# Patient Record
Sex: FEMALE | Race: WHITE | NOT HISPANIC OR LATINO | Employment: FULL TIME | URBAN - METROPOLITAN AREA
[De-identification: names, ages, dates, MRNs, and addresses within clinical notes are randomized per-mention and may not be internally consistent; named-entity substitution may affect disease eponyms.]

---

## 2023-08-23 PROBLEM — E04.1 THYROID NODULE: Status: ACTIVE | Noted: 2023-08-23

## 2023-08-23 PROBLEM — G44.209 TENSION HEADACHE: Status: ACTIVE | Noted: 2023-08-23

## 2023-08-23 PROBLEM — J45.909 REACTIVE AIRWAY DISEASE (HHS-HCC): Status: ACTIVE | Noted: 2023-08-23

## 2023-08-23 PROBLEM — E66.9 OBESITY, CLASS I, BMI 30-34.9: Status: ACTIVE | Noted: 2023-08-23

## 2023-08-23 PROBLEM — E78.5 HYPERLIPIDEMIA: Status: ACTIVE | Noted: 2023-08-23

## 2023-08-23 PROBLEM — R59.0 LOCALIZED ENLARGED LYMPH NODES: Status: ACTIVE | Noted: 2023-08-23

## 2023-08-23 PROBLEM — I10 BENIGN HYPERTENSION: Status: ACTIVE | Noted: 2023-08-23

## 2023-08-23 PROBLEM — N93.8 DUB (DYSFUNCTIONAL UTERINE BLEEDING): Status: ACTIVE | Noted: 2023-08-23

## 2023-08-23 PROBLEM — M70.822: Status: ACTIVE | Noted: 2023-08-23

## 2023-08-23 PROBLEM — E66.811 OBESITY, CLASS I, BMI 30-34.9: Status: ACTIVE | Noted: 2023-08-23

## 2023-08-23 PROBLEM — M25.549 PAIN IN THUMB JOINT WITH MOVEMENT: Status: RESOLVED | Noted: 2023-08-23 | Resolved: 2023-08-23

## 2023-08-23 PROBLEM — M25.472 LEFT ANKLE SWELLING: Status: ACTIVE | Noted: 2023-08-23

## 2023-08-23 RX ORDER — TRIAMTERENE/HYDROCHLOROTHIAZID 37.5-25 MG
1 TABLET ORAL DAILY
COMMUNITY
End: 2023-09-25 | Stop reason: SDUPTHER

## 2023-08-23 RX ORDER — FLUTICASONE PROPIONATE 50 MCG
SPRAY, SUSPENSION (ML) NASAL
COMMUNITY
Start: 2022-11-02 | End: 2023-09-25 | Stop reason: ALTCHOICE

## 2023-08-23 RX ORDER — PROMETHAZINE HYDROCHLORIDE AND DEXTROMETHORPHAN HYDROBROMIDE 6.25; 15 MG/5ML; MG/5ML
SYRUP ORAL
COMMUNITY
Start: 2022-11-02 | End: 2023-09-25 | Stop reason: ALTCHOICE

## 2023-08-23 RX ORDER — LOSARTAN POTASSIUM 50 MG/1
75 TABLET ORAL DAILY
COMMUNITY
Start: 2021-05-28 | End: 2023-09-25 | Stop reason: SDUPTHER

## 2023-08-23 RX ORDER — LEVONORGESTREL 52 MG/1
INTRAUTERINE DEVICE INTRAUTERINE
COMMUNITY

## 2023-08-23 RX ORDER — CYCLOBENZAPRINE HCL 5 MG
TABLET ORAL
COMMUNITY
Start: 2018-05-07 | End: 2023-09-25 | Stop reason: ALTCHOICE

## 2023-08-30 ENCOUNTER — APPOINTMENT (OUTPATIENT)
Dept: PRIMARY CARE | Facility: CLINIC | Age: 48
End: 2023-08-30
Payer: COMMERCIAL

## 2023-09-25 ENCOUNTER — OFFICE VISIT (OUTPATIENT)
Dept: PRIMARY CARE | Facility: CLINIC | Age: 48
End: 2023-09-25
Payer: COMMERCIAL

## 2023-09-25 VITALS
DIASTOLIC BLOOD PRESSURE: 85 MMHG | WEIGHT: 196.25 LBS | HEART RATE: 94 BPM | BODY MASS INDEX: 34.77 KG/M2 | HEIGHT: 63 IN | OXYGEN SATURATION: 95 % | SYSTOLIC BLOOD PRESSURE: 133 MMHG

## 2023-09-25 DIAGNOSIS — I10 BENIGN HYPERTENSION: Primary | ICD-10-CM

## 2023-09-25 DIAGNOSIS — R23.2 HOT FLASHES: ICD-10-CM

## 2023-09-25 PROCEDURE — 1036F TOBACCO NON-USER: CPT | Performed by: FAMILY MEDICINE

## 2023-09-25 PROCEDURE — 3079F DIAST BP 80-89 MM HG: CPT | Performed by: FAMILY MEDICINE

## 2023-09-25 PROCEDURE — 99214 OFFICE O/P EST MOD 30 MIN: CPT | Performed by: FAMILY MEDICINE

## 2023-09-25 PROCEDURE — 3075F SYST BP GE 130 - 139MM HG: CPT | Performed by: FAMILY MEDICINE

## 2023-09-25 PROCEDURE — 3008F BODY MASS INDEX DOCD: CPT | Performed by: FAMILY MEDICINE

## 2023-09-25 RX ORDER — TRIAMTERENE/HYDROCHLOROTHIAZID 37.5-25 MG
1 TABLET ORAL DAILY
Qty: 90 TABLET | Refills: 1 | Status: SHIPPED | OUTPATIENT
Start: 2023-09-25 | End: 2024-03-15 | Stop reason: SDUPTHER

## 2023-09-25 RX ORDER — LOSARTAN POTASSIUM 50 MG/1
75 TABLET ORAL DAILY
Qty: 135 TABLET | Refills: 1 | Status: SHIPPED | OUTPATIENT
Start: 2023-09-25 | End: 2024-03-15 | Stop reason: SDUPTHER

## 2023-09-25 ASSESSMENT — ENCOUNTER SYMPTOMS
CONFUSION: 0
LOSS OF SENSATION IN FEET: 0
BLOOD IN STOOL: 0
WHEEZING: 0
OCCASIONAL FEELINGS OF UNSTEADINESS: 0
CHILLS: 0
VOMITING: 0
LIGHT-HEADEDNESS: 0
ABDOMINAL PAIN: 0
FEVER: 0
DYSURIA: 0
TROUBLE SWALLOWING: 0
DIFFICULTY URINATING: 0
DIZZINESS: 0
SHORTNESS OF BREATH: 0
DIARRHEA: 0
COUGH: 0
NUMBNESS: 0
UNEXPECTED WEIGHT CHANGE: 0
WEAKNESS: 0
DEPRESSION: 0
NAUSEA: 0

## 2023-09-25 ASSESSMENT — PATIENT HEALTH QUESTIONNAIRE - PHQ9
SUM OF ALL RESPONSES TO PHQ9 QUESTIONS 1 AND 2: 0
2. FEELING DOWN, DEPRESSED OR HOPELESS: NOT AT ALL
1. LITTLE INTEREST OR PLEASURE IN DOING THINGS: NOT AT ALL

## 2023-09-25 NOTE — PROGRESS NOTES
"Subjective   Patient ID: Alice Archer is a 48 y.o. female who presents for Establish Care (Pt presents as a new to pt to establish care, no concerns, refills needed.BL).  HPI    Previously was seeing Kyleigh Castellano CNP, last seen August 2022.    Feeling generally well.    Checking BP at home, gets numbers similar to today.    C/o hot flashes, would like to check hormones to check for menopause. Does not have bleeding or even spotting or any symptoms of menses with the Mirena, which was placed < 7y ago (December 2017).      Review of Systems   Constitutional:  Negative for chills, fever and unexpected weight change.   HENT:  Negative for ear pain and trouble swallowing.    Respiratory:  Negative for cough, shortness of breath and wheezing.    Cardiovascular:  Negative for chest pain.   Gastrointestinal:  Negative for abdominal pain, blood in stool, diarrhea, nausea and vomiting.   Genitourinary:  Negative for difficulty urinating and dysuria.   Skin:  Negative for rash.   Neurological:  Negative for dizziness, syncope, weakness, light-headedness and numbness.   Psychiatric/Behavioral:  Negative for behavioral problems and confusion.          Objective   /85   Pulse 94   Ht 1.588 m (5' 2.5\")   Wt 89 kg (196 lb 4 oz)   SpO2 95%   BMI 35.32 kg/m²     Physical Exam  Vitals and nursing note reviewed.   Constitutional:       General: She is not in acute distress.     Appearance: She is not diaphoretic.   HENT:      Head: Normocephalic and atraumatic.   Cardiovascular:      Rate and Rhythm: Normal rate and regular rhythm.      Heart sounds: Normal heart sounds.   Pulmonary:      Effort: Pulmonary effort is normal.      Breath sounds: Normal breath sounds.   Abdominal:      General: Bowel sounds are normal. There is no distension.      Palpations: Abdomen is soft. There is no mass.      Tenderness: There is no abdominal tenderness. There is no guarding or rebound.   Musculoskeletal:      Right lower leg: No " edema.      Left lower leg: No edema.   Skin:     General: Skin is warm and dry.   Neurological:      General: No focal deficit present.      Mental Status: She is alert. Mental status is at baseline.   Psychiatric:         Mood and Affect: Mood normal.         Thought Content: Thought content normal.         Assessment/Plan   Problem List Items Addressed This Visit       Benign hypertension - Primary     Well controlled. Monitor. Return 6 months.         Relevant Medications    losartan (Cozaar) 50 mg tablet    triamterene-hydrochlorothiazid (Maxzide-25) 37.5-25 mg tablet    Other Relevant Orders    Comprehensive Metabolic Panel    CBC    Lipid Panel    TSH with reflex to Free T4 if abnormal    Hot flashes     Requests hormone level check. Check FSH, cautioned might not be accurate, and TSH.         Relevant Orders    TSH with reflex to Free T4 if abnormal    FSH

## 2023-09-25 NOTE — PATIENT INSTRUCTIONS
Monitor your resting blood pressure (BP) and heart rate (HR) at home.     Please return for a follow-up appointment in 6 months, earlier if any question or concern.    Recommend scheduling your annual Wellness visit for 12 months after your previous Wellness visit, or within 1-2 months.    Please be aware that hormonal therapies (e.g., hormonal IUD) have the potential to affect your hormone levels, such that your FSH (follicle-stimulating hormone) level might not be accurate.    For assistance with scheduling referrals or consultations, please call [phone]. For laboratory, radiology, and other tests, please call [phone] ([phone] for pediatrics). Please review prescription inserts and published information for possible adverse effects of all medications. Return after testing or consultation to review results and recommendations, if symptoms persist, change, worsen, or return, or if you have any question or concern. If you do not get results within 7-10 days, or you have any question or concern, please send a message, call the office ([phone]), or return to the office for a follow-up appointment. For non-emergencies, you may call the office. For emergencies, call [phone] or go to the nearest Emergency Department. Please schedule additional appointment(s) to address concern(s) not addressed today.    In general, results are not released or discussed over the telephone. Results of tests done through [facility] are released via  [facility] (see below).  [email or web address]   [facility] support line: [phone]    Until we complete our transition to the new system, additional information can be found at [email or web address] or on your Android or iOS (iPhone, iPad) device using the [employer] karyn available free of charge in your device's karyn store.

## 2024-01-06 ENCOUNTER — LAB (OUTPATIENT)
Dept: LAB | Facility: LAB | Age: 49
End: 2024-01-06
Payer: COMMERCIAL

## 2024-01-06 DIAGNOSIS — I10 BENIGN HYPERTENSION: ICD-10-CM

## 2024-01-06 DIAGNOSIS — R23.2 HOT FLASHES: ICD-10-CM

## 2024-01-06 LAB
ALBUMIN SERPL BCP-MCNC: 4.1 G/DL (ref 3.4–5)
ALP SERPL-CCNC: 70 U/L (ref 33–110)
ALT SERPL W P-5'-P-CCNC: 24 U/L (ref 7–45)
ANION GAP SERPL CALC-SCNC: 13 MMOL/L (ref 10–20)
AST SERPL W P-5'-P-CCNC: 19 U/L (ref 9–39)
BILIRUB SERPL-MCNC: 0.5 MG/DL (ref 0–1.2)
BUN SERPL-MCNC: 15 MG/DL (ref 6–23)
CALCIUM SERPL-MCNC: 9.1 MG/DL (ref 8.6–10.3)
CHLORIDE SERPL-SCNC: 102 MMOL/L (ref 98–107)
CHOLEST SERPL-MCNC: 236 MG/DL (ref 0–199)
CHOLESTEROL/HDL RATIO: 4
CO2 SERPL-SCNC: 27 MMOL/L (ref 21–32)
CREAT SERPL-MCNC: 0.7 MG/DL (ref 0.5–1.05)
ERYTHROCYTE [DISTWIDTH] IN BLOOD BY AUTOMATED COUNT: 13.3 % (ref 11.5–14.5)
FSH SERPL-ACNC: 62.2 IU/L
GFR SERPL CREATININE-BSD FRML MDRD: >90 ML/MIN/1.73M*2
GLUCOSE SERPL-MCNC: 98 MG/DL (ref 74–99)
HCT VFR BLD AUTO: 42.7 % (ref 36–46)
HDLC SERPL-MCNC: 59.7 MG/DL
HGB BLD-MCNC: 14.2 G/DL (ref 12–16)
LDLC SERPL CALC-MCNC: 146 MG/DL
MCH RBC QN AUTO: 32.3 PG (ref 26–34)
MCHC RBC AUTO-ENTMCNC: 33.3 G/DL (ref 32–36)
MCV RBC AUTO: 97 FL (ref 80–100)
NON HDL CHOLESTEROL: 176 MG/DL (ref 0–149)
NRBC BLD-RTO: 0 /100 WBCS (ref 0–0)
PLATELET # BLD AUTO: 334 X10*3/UL (ref 150–450)
POTASSIUM SERPL-SCNC: 4.1 MMOL/L (ref 3.5–5.3)
PROT SERPL-MCNC: 6.8 G/DL (ref 6.4–8.2)
RBC # BLD AUTO: 4.4 X10*6/UL (ref 4–5.2)
SODIUM SERPL-SCNC: 138 MMOL/L (ref 136–145)
TRIGL SERPL-MCNC: 153 MG/DL (ref 0–149)
TSH SERPL-ACNC: 1.18 MIU/L (ref 0.44–3.98)
VLDL: 31 MG/DL (ref 0–40)
WBC # BLD AUTO: 7 X10*3/UL (ref 4.4–11.3)

## 2024-01-06 PROCEDURE — 83001 ASSAY OF GONADOTROPIN (FSH): CPT

## 2024-01-06 PROCEDURE — 80053 COMPREHEN METABOLIC PANEL: CPT

## 2024-01-06 PROCEDURE — 84443 ASSAY THYROID STIM HORMONE: CPT

## 2024-01-06 PROCEDURE — 36415 COLL VENOUS BLD VENIPUNCTURE: CPT

## 2024-01-06 PROCEDURE — 80061 LIPID PANEL: CPT

## 2024-01-06 PROCEDURE — 85027 COMPLETE CBC AUTOMATED: CPT

## 2024-01-10 ENCOUNTER — APPOINTMENT (OUTPATIENT)
Dept: PRIMARY CARE | Facility: CLINIC | Age: 49
End: 2024-01-10
Payer: COMMERCIAL

## 2024-03-14 DIAGNOSIS — I10 BENIGN HYPERTENSION: ICD-10-CM

## 2024-03-14 NOTE — TELEPHONE ENCOUNTER
MEDICATION REFILL    Pharmacy Name:     [facility]  Medication requested       Losartan   50 mg  1.5 tabs daily  Tramterene-hydrochlorothiazide 37.5-25 mg                                                   1 tab daily        Dosage [see above]   Quantity     90 days    Allergies     none given  Date of last visit    09/25/2023  Date of Next Appointment   07/09/2024

## 2024-03-15 RX ORDER — LOSARTAN POTASSIUM 50 MG/1
75 TABLET ORAL DAILY
Qty: 135 TABLET | Refills: 1 | Status: SHIPPED | OUTPATIENT
Start: 2024-03-15

## 2024-03-15 RX ORDER — TRIAMTERENE/HYDROCHLOROTHIAZID 37.5-25 MG
1 TABLET ORAL DAILY
Qty: 90 TABLET | Refills: 1 | Status: SHIPPED | OUTPATIENT
Start: 2024-03-15

## 2024-06-20 ENCOUNTER — HOSPITAL ENCOUNTER (EMERGENCY)
Facility: HOSPITAL | Age: 49
Discharge: HOME | End: 2024-06-20
Payer: COMMERCIAL

## 2024-06-20 ENCOUNTER — APPOINTMENT (OUTPATIENT)
Dept: RADIOLOGY | Facility: HOSPITAL | Age: 49
End: 2024-06-20
Payer: COMMERCIAL

## 2024-06-20 VITALS
WEIGHT: 175 LBS | DIASTOLIC BLOOD PRESSURE: 87 MMHG | SYSTOLIC BLOOD PRESSURE: 160 MMHG | BODY MASS INDEX: 32.2 KG/M2 | RESPIRATION RATE: 16 BRPM | TEMPERATURE: 97.9 F | OXYGEN SATURATION: 94 % | HEART RATE: 100 BPM | HEIGHT: 62 IN

## 2024-06-20 DIAGNOSIS — S97.82XA CRUSHING INJURY OF LEFT FOOT, INITIAL ENCOUNTER: Primary | ICD-10-CM

## 2024-06-20 PROCEDURE — 73630 X-RAY EXAM OF FOOT: CPT | Mod: LEFT SIDE | Performed by: RADIOLOGY

## 2024-06-20 PROCEDURE — 73630 X-RAY EXAM OF FOOT: CPT | Mod: LT

## 2024-06-20 PROCEDURE — 99283 EMERGENCY DEPT VISIT LOW MDM: CPT

## 2024-06-20 ASSESSMENT — PAIN DESCRIPTION - PAIN TYPE: TYPE: ACUTE PAIN

## 2024-06-20 ASSESSMENT — LIFESTYLE VARIABLES
HAVE PEOPLE ANNOYED YOU BY CRITICIZING YOUR DRINKING: NO
EVER FELT BAD OR GUILTY ABOUT YOUR DRINKING: NO
HAVE YOU EVER FELT YOU SHOULD CUT DOWN ON YOUR DRINKING: NO
TOTAL SCORE: 0
EVER HAD A DRINK FIRST THING IN THE MORNING TO STEADY YOUR NERVES TO GET RID OF A HANGOVER: NO

## 2024-06-20 ASSESSMENT — COLUMBIA-SUICIDE SEVERITY RATING SCALE - C-SSRS
6. HAVE YOU EVER DONE ANYTHING, STARTED TO DO ANYTHING, OR PREPARED TO DO ANYTHING TO END YOUR LIFE?: NO
2. HAVE YOU ACTUALLY HAD ANY THOUGHTS OF KILLING YOURSELF?: NO
1. IN THE PAST MONTH, HAVE YOU WISHED YOU WERE DEAD OR WISHED YOU COULD GO TO SLEEP AND NOT WAKE UP?: NO

## 2024-06-20 ASSESSMENT — PAIN SCALES - GENERAL: PAINLEVEL_OUTOF10: 7

## 2024-06-20 ASSESSMENT — PAIN - FUNCTIONAL ASSESSMENT: PAIN_FUNCTIONAL_ASSESSMENT: 0-10

## 2024-06-20 ASSESSMENT — PAIN DESCRIPTION - LOCATION: LOCATION: FOOT

## 2024-06-20 ASSESSMENT — PAIN DESCRIPTION - ORIENTATION: ORIENTATION: LEFT

## 2024-06-21 NOTE — ED PROVIDER NOTES
HPI   Chief Complaint   Patient presents with    Foot Injury     TV was dropped on left foot approx 1 hour ago. Pt has a history of surgery to the left foot with screws in the foot, no pain medications prior to arrival        This is a 49-year-old female presenting after dropping 100 pound TV on her left foot just PTA.  Minimally able to bear weight since the injury.  Has history of hardware placement distantly in the left foot.  No numbness, tingling or loss of sensation.      History provided by:  Patient   used: No                        [place] Coma Scale Score: 15                     Patient History   Past Medical History:   Diagnosis Date    Cutaneous abscess of left axilla 08/29/2016    Cutaneous abscess of left axilla    Encounter for insertion of intrauterine contraceptive device 12/14/2017    Encounter for insertion of intrauterine contraceptive device (IUD)    Encounter for screening for malignant neoplasm, site unspecified 11/04/2014    Encounter for Pap smear    Hypertension     Localized swelling, mass and lump, left upper limb 06/24/2020    Mass of left axilla    Other conditions influencing health status     Dysplasia    Pain in thumb joint with movement 08/23/2023    Personal history of other endocrine, nutritional and metabolic disease     History of hypokalemia    Personal history of other infectious and parasitic diseases     History of chickenpox    Personal history of other infectious and parasitic diseases     History of HPV infection    Personal history of other specified conditions 10/10/2018    History of diarrhea    Unspecified symptoms and signs involving the genitourinary system 08/06/2020    UTI symptoms    Urinary tract infection, site not specified 11/10/2016    Acute lower UTI     Past Surgical History:   Procedure Laterality Date    BUNIONECTOMY      CERVICAL BIOPSY  W/ LOOP ELECTRODE EXCISION  08/25/2014    Cervical Loop Electrosurgical Excision (LEEP)      SECTION, LOW TRANSVERSE      FOOT SURGERY  2014    Foot Surgery    OTHER SURGICAL HISTORY  2014    Colposcopy Cervix With Biopsy(S)     Family History   Problem Relation Name Age of Onset    COPD Mother      Febrile seizures Mother       Social History     Tobacco Use    Smoking status: Never    Smokeless tobacco: Never   Vaping Use    Vaping status: Never Used   Substance Use Topics    Alcohol use: Yes     Comment: 312 it varies    Drug use: Never       Physical Exam   ED Triage Vitals [24]   Temperature Heart Rate Respirations BP   36.6 °C (97.9 °F) 100 16 160/87      Pulse Ox Temp src Heart Rate Source Patient Position   94 % -- -- --      BP Location FiO2 (%)     -- --       Physical Exam    Gen.: Vitals noted. No distress  Cardiac: Regular rate rhythm. No murmur.   Pulmonary: Lungs clear bilaterally  Lower extremity: Exam of the left foot shows generalized swelling and bruising most notable over the first and second metatarsal region with a small superficial abrasion over this area.  No laceration.  Compartments soft.  Is neurovascularly intact distally.     ED Course & MDM   Diagnoses as of 24   Crushing injury of left foot, initial encounter       Medical Decision Making  DDx: fracture, dislocation, nonvisualized/occult fracture, tendon/ligament injury, soft tissue injury    Patient is neurovascularly intact.  Compartments soft.  X-ray negative for acute fracture or hardware disruption as read by the radiologist.  Patient was advised of return precautions and warning signs of compartment syndrome.  Given postop shoe and crutches and advised RICE and OTC analgesics/anti-inflammatories as needed given orthopedic referral if needed.  Instructed to return to the nearest ED if any concerns or new or worsening symptoms. Patient verbalized understanding and agreement with plan. Discharged in stable condition.      Disclaimer: This note was dictated using speech recognition  software. An attempt at proofreading was made to minimize errors. Minor errors in transcription may be present. Please call if questions.    Amount and/or Complexity of Data Reviewed  Radiology: ordered.        Procedure  Procedures     Armani Hamilton PA-C  06/20/24 1541

## 2024-06-27 ENCOUNTER — OFFICE VISIT (OUTPATIENT)
Dept: ORTHOPEDIC SURGERY | Facility: CLINIC | Age: 49
End: 2024-06-27
Payer: COMMERCIAL

## 2024-06-27 DIAGNOSIS — S90.32XA CONTUSION OF LEFT FOOT, INITIAL ENCOUNTER: Primary | ICD-10-CM

## 2024-06-27 PROCEDURE — 99203 OFFICE O/P NEW LOW 30 MIN: CPT | Performed by: STUDENT IN AN ORGANIZED HEALTH CARE EDUCATION/TRAINING PROGRAM

## 2024-06-27 PROCEDURE — 99213 OFFICE O/P EST LOW 20 MIN: CPT | Performed by: STUDENT IN AN ORGANIZED HEALTH CARE EDUCATION/TRAINING PROGRAM

## 2024-06-27 ASSESSMENT — PAIN DESCRIPTION - DESCRIPTORS: DESCRIPTORS: ACHING;SORE

## 2024-06-27 ASSESSMENT — PAIN SCALES - GENERAL: PAINLEVEL_OUTOF10: 3

## 2024-06-27 ASSESSMENT — PAIN - FUNCTIONAL ASSESSMENT: PAIN_FUNCTIONAL_ASSESSMENT: 0-10

## 2024-06-27 NOTE — PROGRESS NOTES
ORTHOPAEDIC SURGERY HISTORY & PHYSICAL     Chief Complaint:  Left foot pain    History Of Present Illness  Alice Archer is a 49 y.o. female who presents for evaluation of left foot pain as a referral from Armani Powell PA-C.  Patient sustained an injury to her foot from 06/20/2024 when a CRT TV was dropped onto her left foot.  She was seen in the ER setting where x-rays were obtained and she was recommended to be weightbearing as tolerated in a postoperative shoe.  The shoe did not appear to help her pain so she self discontinued his weight.  She has noticed persistent swelling and has had difficulty fitting into regular shoes.  She has been wearing flats.  She denies any associated numbness, tingling or weakness.  Patient reports a remote history of 2 prior bunionectomies done in the [facility] many years ago.  She reports no residual pain or injury to the feet.  She denies any associated numbness, tingling or weakness.     Past Medical History  Past Medical History:   Diagnosis Date    Cutaneous abscess of left axilla 08/29/2016    Cutaneous abscess of left axilla    Encounter for insertion of intrauterine contraceptive device 12/14/2017    Encounter for insertion of intrauterine contraceptive device (IUD)    Encounter for screening for malignant neoplasm, site unspecified 11/04/2014    Encounter for Pap smear    Hypertension     Localized swelling, mass and lump, left upper limb 06/24/2020    Mass of left axilla    Other conditions influencing health status     Dysplasia    Pain in thumb joint with movement 08/23/2023    Personal history of other endocrine, nutritional and metabolic disease     History of hypokalemia    Personal history of other infectious and parasitic diseases     History of chickenpox    Personal history of other infectious and parasitic diseases     History of HPV infection    Personal history of other specified conditions 10/10/2018    History of diarrhea    Unspecified symptoms and  signs involving the genitourinary system 08/06/2020    UTI symptoms    Urinary tract infection, site not specified 11/10/2016    Acute lower UTI       Surgical History  Recent Surgeries in Orthopaedic Surgery            No cases to display             Social History  Social History     Socioeconomic History    Marital status:      Spouse name: Not on file    Number of children: Not on file    Years of education: Not on file    Highest education level: Not on file   Occupational History    Not on file   Tobacco Use    Smoking status: Never    Smokeless tobacco: Never   Vaping Use    Vaping status: Never Used   Substance and Sexual Activity    Alcohol use: Yes     Comment: 3-12 it varies    Drug use: Never    Sexual activity: Not on file   Other Topics Concern    Not on file   Social History Narrative    Not on file     Social Determinants of Health     Financial Resource Strain: Not on file   Food Insecurity: Not on file   Transportation Needs: Not on file   Physical Activity: Not on file   Stress: Not on file   Social Connections: Not on file   Intimate Partner Violence: Not on file   Housing Stability: Not on file       Family History  Family History   Problem Relation Name Age of Onset    COPD Mother      Febrile seizures Mother          Allergies  Allergies   Allergen Reactions    Oxycodone-Acetaminophen Hives and Unknown    Sulfamethoxazole-Trimethoprim Swelling and Unknown    Amoxicillin Other and Rash    Clarithromycin Rash and Unknown    Loracarbef Rash       Review of Systems  REVIEW OF SYSTEMS  Constitutional: no unplanned weight loss  Psychiatric: no suicidal ideation  ENT: no vision changes, no sinus problems  Pulmonary: no shortness of breath  Lymphatic: no enlarged lymph nodes  Cardiovascular: no chest pain or shortness of breath  Gastrointestinal: no stomach problems  Genitourinary: no dysuria   Skin: no rashes  Endocrine: no thyroid problems  Neurological: no headache, no  numbness  Hematological: no easy bruising  Musculoskeletal: Left foot pain    Physical Exam  PHYSICAL EXAMINATION  Constitutional Exam: well developed and well nourished  Psychiatric Exam: alert and oriented, appropriate mood and behavior  Eye Exam: EOMI  Pulmonary Exam: breathing non-labored, no apparent distress  Lymphatic exam: no appreciable lymphadenopathy in the lower extremities  Cardiovascular exam: RRR to peripheral palpation, DP pulses 2+, PT 2+, toes are pink with good capillary refill, no pitting edema  Skin exam: no open lesions, rashes, abrasions or ulcerations  Neurological exam: sensation to light touch intact in both lower extremities in peripheral and dermatomal distributions (except for any abnormalities noted in musculoskeletal exam)    Musculoskeletal exam: Left lower extremity examination.  Patient has obvious ecchymosis overlying the dorsum of the forefoot, particularly medially.  Patient has well-healed dorsal medial bunionectomy incisions.  Patient has pain-free and supple midtarsal joint range of motion, first metatarsophalangeal joint range of motion and 2 through 5 toe range of motion.  Patient has sensation intact to light touch grossly in a saphenous, sural, superficial peroneal, deep peroneal and tibial nerve distribution.  Patient has 5 out of 5 strength in plantarflexion, dorsiflexion and EHL. Patient has 2+ DP/PT pulse palpated.    Last Recorded Vitals  There were no vitals taken for this visit.    Laboratory Results    No results found for this or any previous visit (from the past 24 hour(s)).    Radiology Results   X-ray imaging 3 view nonweightbearing left foot reviewed from 06/20/2024 and independently evaluated by me demonstrates no acute fracture or dislocation, sequela of prior bunionectomy noted.    Assessment/Plan:  49-year-old female who my impression has left foot contusion following direct blow to the dorsum of her foot without radiographically identifiable fracture.  I  have reviewed the diagnosis and treatment options extensively with the patient.  I would recommend the patient continue weightbearing to tolerance in her left lower extremity.  Discussed the role for sturdy, supportive and accommodative footwear.  She may certainly continue with rest and elevation and would likely benefit from compression socks or wraps for swelling control.  I have no formal restrictions for her at this time point but have encouraged the patient to contact the office if she develops any new pain, worsening symptoms if she has any further questions.  Upon return, patient will require three-view weightbearing left foot.    Paul Higginbotham MD, ZACKERY  Department of Orthopaedic Surgery  [facility]    The diagnosis and treatment plan were reviewed with the patient. All questions were answered. The patient verbalized understanding of the treatment plan. There were no barriers to understanding identified.    Note dictated with [employer] software.  Completed without full type editing and sent to avoid delay.

## 2024-06-27 NOTE — LETTER
June 27, 2024     Alfonzo Meléndez DO  [address]  [address]  [facility] [address]    Patient: Alice Archer   YOB: 1975   Date of Visit: 6/27/2024       Dear Dr. Alfonzo Meléndez DO:    Thank you for referring Alice Archer to me for evaluation. Below are my notes for this consultation.  If you have questions, please do not hesitate to call me. I look forward to following your patient along with you.       Sincerely,     Paul Higginbotham MD      CC: Armani Hamilton PA-C  ______________________________________________________________________________________    ORTHOPAEDIC SURGERY HISTORY & PHYSICAL     Chief Complaint:  Left foot pain    History Of Present Illness  Alice Archer is a 49 y.o. female who presents for evaluation of left foot pain as a referral from Armani Powell PA-C.  Patient sustained an injury to her foot from 06/20/2024 when a CRT TV was dropped onto her left foot.  She was seen in the ER setting where x-rays were obtained and she was recommended to be weightbearing as tolerated in a postoperative shoe.  The shoe did not appear to help her pain so she self discontinued his weight.  She has noticed persistent swelling and has had difficulty fitting into regular shoes.  She has been wearing flats.  She denies any associated numbness, tingling or weakness.  Patient reports a remote history of 2 prior bunionectomies done in the [place] area many years ago.  She reports no residual pain or injury to the feet.  She denies any associated numbness, tingling or weakness.     Past Medical History  Past Medical History:   Diagnosis Date   • Cutaneous abscess of left axilla 08/29/2016    Cutaneous abscess of left axilla   • Encounter for insertion of intrauterine contraceptive device 12/14/2017    Encounter for insertion of intrauterine contraceptive device (IUD)   • Encounter for screening for malignant neoplasm, site unspecified 11/04/2014    Encounter for Pap smear   • Hypertension     • Localized swelling, mass and lump, left upper limb 06/24/2020    Mass of left axilla   • Other conditions influencing health status     Dysplasia   • Pain in thumb joint with movement 08/23/2023   • Personal history of other endocrine, nutritional and metabolic disease     History of hypokalemia   • Personal history of other infectious and parasitic diseases     History of chickenpox   • Personal history of other infectious and parasitic diseases     History of HPV infection   • Personal history of other specified conditions 10/10/2018    History of diarrhea   • Unspecified symptoms and signs involving the genitourinary system 08/06/2020    UTI symptoms   • Urinary tract infection, site not specified 11/10/2016    Acute lower UTI       Surgical History  Recent Surgeries in Orthopaedic Surgery            No cases to display             Social History  Social History     Socioeconomic History   • Marital status:      Spouse name: Not on file   • Number of children: Not on file   • Years of education: Not on file   • Highest education level: Not on file   Occupational History   • Not on file   Tobacco Use   • Smoking status: Never   • Smokeless tobacco: Never   Vaping Use   • Vaping status: Never Used   Substance and Sexual Activity   • Alcohol use: Yes     Comment: 3-12 it varies   • Drug use: Never   • Sexual activity: Not on file   Other Topics Concern   • Not on file   Social History Narrative   • Not on file     Social Determinants of Health     Financial Resource Strain: Not on file   Food Insecurity: Not on file   Transportation Needs: Not on file   Physical Activity: Not on file   Stress: Not on file   Social Connections: Not on file   Intimate Partner Violence: Not on file   Housing Stability: Not on file       Family History  Family History   Problem Relation Name Age of Onset   • COPD Mother     • Febrile seizures Mother          Allergies  Allergies   Allergen Reactions   •  Oxycodone-Acetaminophen Hives and Unknown   • Sulfamethoxazole-Trimethoprim Swelling and Unknown   • Amoxicillin Other and Rash   • Clarithromycin Rash and Unknown   • Loracarbef Rash       Review of Systems  REVIEW OF SYSTEMS  Constitutional: no unplanned weight loss  Psychiatric: no suicidal ideation  ENT: no vision changes, no sinus problems  Pulmonary: no shortness of breath  Lymphatic: no enlarged lymph nodes  Cardiovascular: no chest pain or shortness of breath  Gastrointestinal: no stomach problems  Genitourinary: no dysuria   Skin: no rashes  Endocrine: no thyroid problems  Neurological: no headache, no numbness  Hematological: no easy bruising  Musculoskeletal: Left foot pain    Physical Exam  PHYSICAL EXAMINATION  Constitutional Exam: well developed and well nourished  Psychiatric Exam: alert and oriented, appropriate mood and behavior  Eye Exam: EOMI  Pulmonary Exam: breathing non-labored, no apparent distress  Lymphatic exam: no appreciable lymphadenopathy in the lower extremities  Cardiovascular exam: RRR to peripheral palpation, DP pulses 2+, PT 2+, toes are pink with good capillary refill, no pitting edema  Skin exam: no open lesions, rashes, abrasions or ulcerations  Neurological exam: sensation to light touch intact in both lower extremities in peripheral and dermatomal distributions (except for any abnormalities noted in musculoskeletal exam)    Musculoskeletal exam: Left lower extremity examination.  Patient has obvious ecchymosis overlying the dorsum of the forefoot, particularly medially.  Patient has well-healed dorsal medial bunionectomy incisions.  Patient has pain-free and supple midtarsal joint range of motion, first metatarsophalangeal joint range of motion and 2 through 5 toe range of motion.  Patient has sensation intact to light touch grossly in a saphenous, sural, superficial peroneal, deep peroneal and tibial nerve distribution.  Patient has 5 out of 5 strength in plantarflexion,  dorsiflexion and EHL. Patient has 2+ DP/PT pulse palpated.    Last Recorded Vitals  There were no vitals taken for this visit.    Laboratory Results    No results found for this or any previous visit (from the past 24 hour(s)).    Radiology Results   X-ray imaging 3 view nonweightbearing left foot reviewed from 06/20/2024 and independently evaluated by me demonstrates no acute fracture or dislocation, sequela of prior bunionectomy noted.    Assessment/Plan:  49-year-old female who my impression has left foot contusion following direct blow to the dorsum of her foot without radiographically identifiable fracture.  I have reviewed the diagnosis and treatment options extensively with the patient.  I would recommend the patient continue weightbearing to tolerance in her left lower extremity.  Discussed the role for sturdy, supportive and accommodative footwear.  She may certainly continue with rest and elevation and would likely benefit from compression socks or wraps for swelling control.  I have no formal restrictions for her at this time point but have encouraged the patient to contact the office if she develops any new pain, worsening symptoms if she has any further questions.  Upon return, patient will require three-view weightbearing left foot.    Paul Higginbotham MD, ZACKERY  Department of Orthopaedic Surgery  [facility]    The diagnosis and treatment plan were reviewed with the patient. All questions were answered. The patient verbalized understanding of the treatment plan. There were no barriers to understanding identified.    Note dictated with [employer] software.  Completed without full type editing and sent to avoid delay.

## 2024-07-05 PROBLEM — Z86.19 HISTORY OF VARICELLA: Status: ACTIVE | Noted: 2024-07-05

## 2024-07-05 PROBLEM — E87.6 HYPOKALEMIA: Status: ACTIVE | Noted: 2024-07-05

## 2024-07-05 PROBLEM — R19.7 DIARRHEA: Status: RESOLVED | Noted: 2024-07-05 | Resolved: 2024-07-05

## 2024-07-05 PROBLEM — Z86.19 HISTORY OF INFECTION DUE TO HUMAN PAPILLOMA VIRUS (HPV): Status: ACTIVE | Noted: 2024-07-05

## 2024-07-05 PROBLEM — Z97.5 PRESENCE OF INTRAUTERINE CONTRACEPTIVE DEVICE: Status: ACTIVE | Noted: 2024-07-05

## 2024-07-09 ENCOUNTER — APPOINTMENT (OUTPATIENT)
Dept: PRIMARY CARE | Facility: CLINIC | Age: 49
End: 2024-07-09
Payer: COMMERCIAL

## 2024-07-09 VITALS
OXYGEN SATURATION: 95 % | WEIGHT: 197.6 LBS | HEART RATE: 83 BPM | SYSTOLIC BLOOD PRESSURE: 136 MMHG | BODY MASS INDEX: 36.36 KG/M2 | HEIGHT: 62 IN | DIASTOLIC BLOOD PRESSURE: 85 MMHG

## 2024-07-09 DIAGNOSIS — I10 BENIGN HYPERTENSION: ICD-10-CM

## 2024-07-09 DIAGNOSIS — Z00.00 WELL ADULT HEALTH CHECK: Primary | ICD-10-CM

## 2024-07-09 LAB
POC APPEARANCE, URINE: CLEAR
POC BILIRUBIN, URINE: NEGATIVE
POC BLOOD, URINE: NEGATIVE
POC COLOR, URINE: YELLOW
POC GLUCOSE, URINE: NEGATIVE MG/DL
POC KETONES, URINE: NEGATIVE MG/DL
POC LEUKOCYTES, URINE: NEGATIVE
POC NITRITE,URINE: NEGATIVE
POC PH, URINE: 8.5 PH
POC PROTEIN, URINE: NEGATIVE MG/DL
POC SPECIFIC GRAVITY, URINE: 1.02
POC UROBILINOGEN, URINE: 0.2 EU/DL

## 2024-07-09 PROCEDURE — 3079F DIAST BP 80-89 MM HG: CPT | Performed by: FAMILY MEDICINE

## 2024-07-09 PROCEDURE — 81003 URINALYSIS AUTO W/O SCOPE: CPT | Performed by: FAMILY MEDICINE

## 2024-07-09 PROCEDURE — 99396 PREV VISIT EST AGE 40-64: CPT | Performed by: FAMILY MEDICINE

## 2024-07-09 PROCEDURE — 1036F TOBACCO NON-USER: CPT | Performed by: FAMILY MEDICINE

## 2024-07-09 PROCEDURE — 3075F SYST BP GE 130 - 139MM HG: CPT | Performed by: FAMILY MEDICINE

## 2024-07-09 PROCEDURE — 99213 OFFICE O/P EST LOW 20 MIN: CPT | Performed by: FAMILY MEDICINE

## 2024-07-09 RX ORDER — TRIAMTERENE/HYDROCHLOROTHIAZID 37.5-25 MG
1 TABLET ORAL DAILY
Qty: 90 TABLET | Refills: 1 | Status: SHIPPED | OUTPATIENT
Start: 2024-07-09

## 2024-07-09 RX ORDER — LOSARTAN POTASSIUM 50 MG/1
50 TABLET ORAL DAILY
Qty: 90 TABLET | Refills: 1 | Status: SHIPPED | OUTPATIENT
Start: 2024-07-09

## 2024-07-09 NOTE — PROGRESS NOTES
Subjective   Patient ID: Alice Archer is a 49 y.o. female who presents for Annual Exam.  HPI Historian(s): Self    Generally feeling well.    BP at home usually ~135 systolic. Thinks her BP may have been higher when taking Losartan 75 mg instead of 50 mg.    Review of Systems  Do you have:  Any eye problems:    N  2. Frequent nasal congestion or sneezing:  YES  3. Difficulty hearing:  N  4. Ear problems:   N  Are you troubled by:  5. Asthma or wheezing:   N  6. Frequent cough:   N  7. Shortness of breath:N  8. Hemoptysis: N  9. Hx of TB: N  Do you have or have you been told you had:  10. High blood pressure: YES  11. Heart disease: N  12. Heart murmur: N  Do you ever have:  13. Chest pain or pressure with exertion:N  14. Leg pains with walking up hill: N  15. Fast heartbeat or palpitations: YES  16. Varicose veins: N  Do you have or are you troubled by:  17. Difficulty swallowing foods or liquids: N  18. Abdominal pains: N  19. Frequent indigestion or heartburn: N  20. Constipation: N  21. Diarrhea or loose stools: N  Has there been a definite change:  22. In weight recently: N  23. In bowel movements: N  Have you ever had or been told you have:  24. An ulcer: N  25. Black stools: N  26. Jaundice, hepatitis or liver problems: N  27. Gallstones or gallbladder problems: N  28. Stomach or intestinal problems: N  Have you ever:  29. Vomited blood : N  30. Blood in bowel movements: N  31. Been anemic or been treated for blood problems: N  32. Had sickle cell trait or anemia: N  33. Been refused as a blood donor:   Have you had or do you have:  34. Problems with your kidney, bladder, or prostate: N  35. Loss of control of your urine: N  36. Pain or burning with urination: N  37. Blood in your urine: N  38. Trouble starting flow of urine: N  39. Frequent urination at night: N  Have you ever been treated for or told you had:  40. Venereal disease: N  Do you have:  41. Any skin problems: N  42. Diabetes: N  43. Thyroid  "disease: N  Are you troubled by:  44. Frequent back pain: N  45. Pain or swelling around joints: N  Have you ever:  46. Broken any bones: N  Are you troubled by:  47. Frequent headaches: N  48. Dizziness: N  49. Had Seizures or convulsions: N  50. Temporarily lost control of your hand or foot : N   51. Had a stroke or been paralyzed : N  52. Temporarily lost your ability to speak: N  53. Fainted or lost consciousness: N  Have you ever had:  54. Hallucinations: N  55. Much trouble with Nervousness: N  56. Do you take medications for your nerves: N  57. Trouble falling asleep or staying asleep: N  58. Do you feel tired even after a good night sleep: N  59. Do you often feel down in the dumps or depressed: N  60. Do you often feel like crying without any reason: N  61. Do you think that you may be using alcohol excessively: N  62. Do you use any street drugs : N     Do have any other medical problems that are concerning to you :  none       Patient Care Team:  Alfonzo Meléndez DO as PCP - General (Family Medicine)  Alfonzo Meléndez DO as PCP - [facility] PCP  DO Milady Tompkins MD as Obstetrician (Obstetrics and Gynecology)    Objective   /85   Pulse 83   Ht 1.581 m (5' 2.25\")   Wt 89.6 kg (197 lb 9.6 oz)   SpO2 95%   BMI 35.85 kg/m²         Physical Exam  Vitals and nursing note reviewed.   Constitutional:       General: She is not in acute distress.     Appearance: Normal appearance. She is well-developed.      Comments: No assistive device presently being used.   HENT:      Head: Normocephalic and atraumatic.      Nose: Nose normal.   Eyes:      General: No scleral icterus.     Extraocular Movements: Extraocular movements intact.      Conjunctiva/sclera: Conjunctivae normal.   Neck:      Thyroid: No thyromegaly.      Vascular: No carotid bruit or JVD.   Cardiovascular:      Rate and Rhythm: Normal rate and regular rhythm.      Heart sounds: Normal heart sounds.   Pulmonary:      " Effort: Pulmonary effort is normal. No respiratory distress.      Breath sounds: Normal breath sounds.   Abdominal:      General: Bowel sounds are normal. There is no distension.      Palpations: Abdomen is soft. There is no mass.      Tenderness: There is no abdominal tenderness. There is no guarding or rebound.   Musculoskeletal:      Cervical back: Normal range of motion. No tenderness.      Right lower leg: Edema (trace pitting) present.      Left lower leg: Edema (trace pitting) present.   Skin:     General: Skin is warm and dry.      Coloration: Skin is not jaundiced.   Neurological:      General: No focal deficit present.      Mental Status: She is alert and oriented to person, place, and time. Mental status is at baseline.   Psychiatric:         Mood and Affect: Mood normal.         Behavior: Behavior normal.         Thought Content: Thought content normal.         Assessment/Plan   Problem List Items Addressed This Visit       Benign hypertension    Current Assessment & Plan     Well controlled. Monitor. Return 6 months. Might consider targeting < 130 systolic.         Relevant Medications    losartan (Cozaar) 50 mg tablet    triamterene-hydrochlorothiazid (Maxzide-25) 37.5-25 mg tablet    Other Relevant Orders    POCT UA Automated manually resulted    Well adult health check - Primary    Current Assessment & Plan     49yF doing fairly well.

## 2024-07-09 NOTE — PATIENT INSTRUCTIONS
Please return for a(n) blood pressure and medication follow-up appointment in 6 months, earlier if any question or concern. Please schedule additional problem-focused appointment(s) to address additional problem(s).    Recommended vaccines:  Influenza, annual  Shingrix (shingles) vaccine series  Avoid taking Biotin (a vitamin, shows up particularly in hair/nail supplements) for a week prior to any blood tests, as it can interfere with certain results. Fasting for labs means 12 hours, nothing to eat or drink, except water and medications, unless directed otherwise.    For assistance with scheduling referrals or consultations, please call [phone]. For laboratory, radiology, and other tests, please call [phone] ([phone] for pediatrics). Please review prescription inserts and published information for possible adverse effects of all medications. Return after testing or consultation to review results and recommendations, if symptoms persist, change, worsen, or return, or if you have any question or concern. If you do not get results within 7-10 days, or you have any question or concern, please send a message, call the office ([phone]), or return to the office for a follow-up appointment. For non-emergencies, you may call the office. For emergencies, call 9-1-1 or go to the nearest Emergency Department. Please schedule additional appointment(s) to address concern(s) not addressed today.    In general, results are not released or discussed over the telephone, but at an appointment or via  [employer]. Results of tests done through [facility] are released via  [employer] (see below).  [email or web address]   [employer] support line: [phone]

## 2024-08-27 ENCOUNTER — HOSPITAL ENCOUNTER (OUTPATIENT)
Dept: RADIOLOGY | Facility: CLINIC | Age: 49
Discharge: HOME | End: 2024-08-27
Payer: COMMERCIAL

## 2024-08-27 ENCOUNTER — OFFICE VISIT (OUTPATIENT)
Dept: ORTHOPEDIC SURGERY | Facility: CLINIC | Age: 49
End: 2024-08-27
Payer: COMMERCIAL

## 2024-08-27 DIAGNOSIS — M20.12 HALLUX VALGUS OF LEFT FOOT: ICD-10-CM

## 2024-08-27 DIAGNOSIS — S90.32XA CONTUSION OF LEFT FOOT, INITIAL ENCOUNTER: ICD-10-CM

## 2024-08-27 DIAGNOSIS — G57.92 NEURITIS OF LEFT FOOT: Primary | ICD-10-CM

## 2024-08-27 PROCEDURE — 73630 X-RAY EXAM OF FOOT: CPT | Mod: LT

## 2024-08-27 PROCEDURE — 99213 OFFICE O/P EST LOW 20 MIN: CPT | Performed by: STUDENT IN AN ORGANIZED HEALTH CARE EDUCATION/TRAINING PROGRAM

## 2024-08-27 PROCEDURE — 73630 X-RAY EXAM OF FOOT: CPT | Mod: LEFT SIDE | Performed by: RADIOLOGY

## 2024-08-27 RX ORDER — GABAPENTIN 300 MG/1
300 CAPSULE ORAL NIGHTLY
Qty: 90 CAPSULE | Refills: 0 | Status: SHIPPED | OUTPATIENT
Start: 2024-08-27 | End: 2024-11-25

## 2024-08-27 RX ORDER — CAPSAICIN 0.03 G/100G
CREAM TOPICAL 2 TIMES DAILY
Qty: 56.6 G | Refills: 1 | Status: SHIPPED | OUTPATIENT
Start: 2024-08-27 | End: 2025-08-27

## 2024-08-27 ASSESSMENT — PAIN DESCRIPTION - DESCRIPTORS: DESCRIPTORS: SHARP;SHOOTING

## 2024-08-27 ASSESSMENT — PAIN - FUNCTIONAL ASSESSMENT: PAIN_FUNCTIONAL_ASSESSMENT: 0-10

## 2024-08-27 NOTE — PROGRESS NOTES
ORTHOPAEDIC SURGERY HISTORY & PHYSICAL     Chief Complaint:  Left foot pain    History Of Present Illness  Alice Archer is a 49 y.o. female who presents for evaluation of left foot pain as a referral from Armani Powell PA-C.  Patient sustained an injury to her foot from 06/20/2024 when a CRT TV was dropped onto her left foot.  She was seen in the ER setting where x-rays were obtained and she was recommended to be weightbearing as tolerated in a postoperative shoe.  The shoe did not appear to help her pain so she self discontinued his weight.  She has noticed persistent swelling and has had difficulty fitting into regular shoes.  She has been wearing flats.  She denies any associated numbness, tingling or weakness.  Patient reports a remote history of 2 prior bunionectomies done in the [facility] many years ago.  She reports no residual pain or injury to the feet.  She denies any associated numbness, tingling or weakness.    08/27/2024: Patient returns for follow-up of her left foot pain.  She continues with intermittent numbness as well as sharp and pinching pain.  The numbness is constant and the pinching pain appears to be random.  Pain ranges from 0 out of 10 to 8 out of 10.  Pain is made worse with walking, she can walk up to a mile but does notice significant pain with walking.  She has been elevating and icing her leg without relief.  For review, the patient has had 2 prior bunionectomy surgeries with Dr. Laureano.     Past Medical History  Past Medical History:   Diagnosis Date    Cutaneous abscess of left axilla 08/29/2016    Cutaneous abscess of left axilla    Encounter for insertion of intrauterine contraceptive device 12/14/2017    Encounter for insertion of intrauterine contraceptive device (IUD)    Encounter for screening for malignant neoplasm, site unspecified 11/04/2014    Encounter for Pap smear    Hypertension     Localized swelling, mass and lump, left upper limb 06/24/2020    Mass of left axilla     Other conditions influencing health status     Dysplasia    Pain in thumb joint with movement 08/23/2023    Personal history of other endocrine, nutritional and metabolic disease     History of hypokalemia    Personal history of other infectious and parasitic diseases     History of chickenpox    Personal history of other infectious and parasitic diseases     History of HPV infection    Personal history of other specified conditions 10/10/2018    History of diarrhea    Sprain of thoracic region 03/25/2011    Unspecified symptoms and signs involving the genitourinary system 08/06/2020    UTI symptoms    Urinary tract infection, site not specified 11/10/2016    Acute lower UTI       Surgical History  Recent Surgeries in Orthopaedic Surgery            No cases to display             Social History  Social History     Socioeconomic History    Marital status:    Tobacco Use    Smoking status: Never    Smokeless tobacco: Never   Vaping Use    Vaping status: Never Used   Substance and Sexual Activity    Alcohol use: Yes     Comment: 3-12 it varies    Drug use: Never       Family History  Family History   Problem Relation Name Age of Onset    COPD Mother      Febrile seizures Mother      Hyperlipidemia Father      Hyperlipidemia Paternal Grandfather          Allergies  Allergies   Allergen Reactions    Oxycodone-Acetaminophen Hives and Unknown    Sulfa (Sulfonamide Antibiotics) Swelling    Erythromycin Other and Unknown     This was from childhood    Amoxicillin Other and Rash    Clarithromycin Rash and Unknown    Loracarbef Rash     She doesn't recall the severity.    Penicillins Hives, Other and Rash     Severity of the rash is not recalled.    Sulfamethoxazole-Trimethoprim Swelling, Unknown and Rash       Review of Systems  REVIEW OF SYSTEMS  Constitutional: no unplanned weight loss  Psychiatric: no suicidal ideation  ENT: no vision changes, no sinus problems  Pulmonary: no shortness of breath  Lymphatic: no  enlarged lymph nodes  Cardiovascular: no chest pain or shortness of breath  Gastrointestinal: no stomach problems  Genitourinary: no dysuria   Skin: no rashes  Endocrine: no thyroid problems  Neurological: no headache, no numbness  Hematological: no easy bruising  Musculoskeletal: Left foot pain    Physical Exam  PHYSICAL EXAMINATION  Constitutional Exam: well developed and well nourished  Psychiatric Exam: alert and oriented, appropriate mood and behavior  Eye Exam: EOMI  Pulmonary Exam: breathing non-labored, no apparent distress  Lymphatic exam: no appreciable lymphadenopathy in the lower extremities  Cardiovascular exam: RRR to peripheral palpation, DP pulses 2+, PT 2+, toes are pink with good capillary refill, no pitting edema  Skin exam: no open lesions, rashes, abrasions or ulcerations  Neurological exam: sensation to light touch intact in both lower extremities in peripheral and dermatomal distributions (except for any abnormalities noted in musculoskeletal exam)    Musculoskeletal exam: Left lower extremity examination.  Patient with well-healed dorsal medial bunion incision.  She has exquisite sensitivity to light touch overlying the dorsal medial cutaneous nerve with walter allodynia.  Patient has pain-free and supple midtarsal as well as first metatarsophalangeal joint range of motion.  Negative piano key test.  No significant pain with midfoot stress. Patient has sensation intact to light touch grossly in a saphenous, sural, superficial peroneal, deep peroneal and tibial nerve distribution.  Patient has 5 out of 5 strength in plantarflexion, dorsiflexion and EHL. Patient has 2+ DP/PT pulse palpated.    Last Recorded Vitals  There were no vitals taken for this visit.    Laboratory Results    No results found for this or any previous visit (from the past 24 hour(s)).    Radiology Results   X-ray imaging 3 view nonweightbearing left foot reviewed from 06/20/2024 and independently evaluated by me  demonstrates no acute fracture or dislocation, sequela of prior bunionectomy noted.    X-ray imaging 3 view weightbearing left foot reviewed from 08/27/2024 and independently evaluated by me demonstrates no acute fracture or dislocation.  Noted sequela of prior silver bunionectomy.  Hallux valgus with decreased joint space of the first metatarsophalangeal joint with noted dorsal first metatarsal head osteophyte as well as lateral metatarsal head osteophyte.    Assessment/Plan:  49-year-old female who in my impression has continued left foot pain likely secondary to neuritis in the setting of HR/HV.  I have reviewed the diagnosis and treatment options extensively with the patient.  I would recommend the patient continue weightbearing to her tolerance in her left lower extremity.  I again reviewed the role for sturdy and supportive and accommodative footwear and have asked her to offload the painful region with moleskin or bunion pad.  She should work on desensitization and I will provide her with a prescription for gabapentin as well as capsaicin.  I discussed that this may take 6 to 12 months to completely resolve.  I will plan to see the patient back in approximately 6 weeks for repeat clinical evaluation.  If the patient is not clinically improving may consider MRI to rule out other pathology in the setting of persistent pain.  Upon return, patient would not require further imaging.    Paul Higginbotham MD, ZACKERY  Department of Orthopaedic Surgery  [facility]    The diagnosis and treatment plan were reviewed with the patient. All questions were answered. The patient verbalized understanding of the treatment plan. There were no barriers to understanding identified.    Note dictated with Open[employer]o software.  Completed without full type editing and sent to avoid delay.

## 2024-10-08 ENCOUNTER — OFFICE VISIT (OUTPATIENT)
Dept: ORTHOPEDIC SURGERY | Facility: CLINIC | Age: 49
End: 2024-10-08
Payer: COMMERCIAL

## 2024-10-08 DIAGNOSIS — G57.92 NEURITIS OF LEFT FOOT: Primary | ICD-10-CM

## 2024-10-08 PROCEDURE — 99212 OFFICE O/P EST SF 10 MIN: CPT | Performed by: STUDENT IN AN ORGANIZED HEALTH CARE EDUCATION/TRAINING PROGRAM

## 2024-10-08 ASSESSMENT — PAIN DESCRIPTION - DESCRIPTORS: DESCRIPTORS: SORE

## 2024-10-08 ASSESSMENT — PAIN SCALES - GENERAL: PAINLEVEL_OUTOF10: 1

## 2024-10-08 ASSESSMENT — PAIN - FUNCTIONAL ASSESSMENT: PAIN_FUNCTIONAL_ASSESSMENT: 0-10

## 2024-10-08 NOTE — PROGRESS NOTES
ORTHOPAEDIC SURGERY PROGRESS NOTE    Chief Complaint:  Left foot pain    History Of Present Illness  Alice Archer is a 49 y.o. female who presents for evaluation of left foot pain as a referral from Armani Powell PA-C.  Patient sustained an injury to her foot from 06/20/2024 when a CRT TV was dropped onto her left foot.  She was seen in the ER setting where x-rays were obtained and she was recommended to be weightbearing as tolerated in a postoperative shoe.  The shoe did not appear to help her pain so she self discontinued his weight.  She has noticed persistent swelling and has had difficulty fitting into regular shoes.  She has been wearing flats.  She denies any associated numbness, tingling or weakness.  Patient reports a remote history of 2 prior bunionectomies done in the [facility] many years ago.  She reports no residual pain or injury to the feet.  She denies any associated numbness, tingling or weakness.    08/27/2024: Patient returns for follow-up of her left foot pain.  She continues with intermittent numbness as well as sharp and pinching pain.  The numbness is constant and the pinching pain appears to be random.  Pain ranges from 0 out of 10 to 8 out of 10.  Pain is made worse with walking, she can walk up to a mile but does notice significant pain with walking.  She has been elevating and icing her leg without relief.  For review, the patient has had 2 prior bunionectomy surgeries with Dr. Laureano.    10/08/2024: Patient returns in follow-up of her left foot pain as above.  She is currently reporting 1 out of 10 pain.  She continues to notice some discoloration of her dorsal medial distal forefoot with sensitivity.  She has not had any interval trauma.     Past Medical History  Past Medical History:   Diagnosis Date    Cutaneous abscess of left axilla 08/29/2016    Cutaneous abscess of left axilla    Encounter for insertion of intrauterine contraceptive device 12/14/2017    Encounter for insertion  of intrauterine contraceptive device (IUD)    Encounter for screening for malignant neoplasm, site unspecified 11/04/2014    Encounter for Pap smear    Hypertension     Localized swelling, mass and lump, left upper limb 06/24/2020    Mass of left axilla    Other conditions influencing health status     Dysplasia    Pain in thumb joint with movement 08/23/2023    Personal history of other endocrine, nutritional and metabolic disease     History of hypokalemia    Personal history of other infectious and parasitic diseases     History of chickenpox    Personal history of other infectious and parasitic diseases     History of HPV infection    Personal history of other specified conditions 10/10/2018    History of diarrhea    Sprain of thoracic region 03/25/2011    Unspecified symptoms and signs involving the genitourinary system 08/06/2020    UTI symptoms    Urinary tract infection, site not specified 11/10/2016    Acute lower UTI       Surgical History  Recent Surgeries in Orthopaedic Surgery            No cases to display             Social History  Social History     Socioeconomic History    Marital status:    Tobacco Use    Smoking status: Never    Smokeless tobacco: Never   Vaping Use    Vaping status: Never Used   Substance and Sexual Activity    Alcohol use: Yes     Comment: 3-12 it varies    Drug use: Never       Family History  Family History   Problem Relation Name Age of Onset    COPD Mother      Febrile seizures Mother      Hyperlipidemia Father      Hyperlipidemia Paternal Grandfather          Allergies  Allergies   Allergen Reactions    Oxycodone-Acetaminophen Hives and Unknown    Sulfa (Sulfonamide Antibiotics) Swelling    Erythromycin Other and Unknown     This was from childhood    Amoxicillin Other and Rash    Clarithromycin Rash and Unknown    Loracarbef Rash     She doesn't recall the severity.    Penicillins Hives, Other and Rash     Severity of the rash is not recalled.     Sulfamethoxazole-Trimethoprim Swelling, Unknown and Rash       Review of Systems  REVIEW OF SYSTEMS  Constitutional: no unplanned weight loss  Psychiatric: no suicidal ideation  ENT: no vision changes, no sinus problems  Pulmonary: no shortness of breath  Lymphatic: no enlarged lymph nodes  Cardiovascular: no chest pain or shortness of breath  Gastrointestinal: no stomach problems  Genitourinary: no dysuria   Skin: no rashes  Endocrine: no thyroid problems  Neurological: no headache, no numbness  Hematological: no easy bruising  Musculoskeletal: Left foot pain    Physical Exam  PHYSICAL EXAMINATION  Constitutional Exam: well developed and well nourished  Psychiatric Exam: alert and oriented, appropriate mood and behavior  Eye Exam: EOMI  Pulmonary Exam: breathing non-labored, no apparent distress  Lymphatic exam: no appreciable lymphadenopathy in the lower extremities  Cardiovascular exam: RRR to peripheral palpation, DP pulses 2+, PT 2+, toes are pink with good capillary refill, no pitting edema  Skin exam: no open lesions, rashes, abrasions or ulcerations  Neurological exam: sensation to light touch intact in both lower extremities in peripheral and dermatomal distributions (except for any abnormalities noted in musculoskeletal exam)    Musculoskeletal exam: Left lower extremity examination.  Patient continues with well-healed dorsal medial bunion incision.  Whereas previously she had walter allodynia in the region of the dorsal medial cutaneous nerve, this is markedly improved.  Negative Tinel's in the region.  Patient has pain-free and supple ankle, subtalar, midtarsal and first metatarsophalangeal joint range of motion. Patient has sensation intact to light touch grossly in a saphenous, sural, superficial peroneal, deep peroneal and tibial nerve distribution.  Patient has 5 out of 5 strength in plantarflexion, dorsiflexion and EHL. Patient has 2+ DP/PT pulse palpated.    Last Recorded Vitals  There were no  vitals taken for this visit.    Laboratory Results    No results found for this or any previous visit (from the past 24 hour(s)).    Radiology Results   No new imaging at this visit.    Assessment/Plan:  49-year-old female who in my impression has continued left foot pain likely secondary to neuritis in the setting of HR/HV.  I have reviewed the diagnosis and treatment options extensively with the patient.  I would recommend the patient continue weightbearing to her tolerance in her left lower extremity.  I would no formal restrictions for her at this time point.  She may certainly continue to utilize bunion pads or moleskin for offloading.  She may also consider capsaicin as we have previously discussed.  I did recommend she continue with desensitization training as this should continue to improve her symptoms overall.  I would anticipate that this may take up to 1 year from injury to completely resolve I will plan to see the patient back at the 1 year tim from injury.  I have encouraged the patient to contact the office if she develops any new pain, worsening symptoms or she has any further questions.  Upon return, patient will require no further imaging.    Paul Higginbotham MD, ZACKERY  Department of Orthopaedic Surgery  [facility]    The diagnosis and treatment plan were reviewed with the patient. All questions were answered. The patient verbalized understanding of the treatment plan. There were no barriers to understanding identified.    Note dictated with Fridge software.  Completed without full type editing and sent to avoid delay.

## 2025-06-10 ENCOUNTER — APPOINTMENT (OUTPATIENT)
Dept: ORTHOPEDIC SURGERY | Facility: CLINIC | Age: 50
End: 2025-06-10
Payer: COMMERCIAL

## 2025-07-05 ENCOUNTER — APPOINTMENT (OUTPATIENT)
Dept: RADIOLOGY | Facility: HOSPITAL | Age: 50
End: 2025-07-05
Payer: COMMERCIAL

## 2025-07-05 ENCOUNTER — HOSPITAL ENCOUNTER (EMERGENCY)
Facility: HOSPITAL | Age: 50
Discharge: HOME | End: 2025-07-05
Payer: COMMERCIAL

## 2025-07-05 VITALS
HEART RATE: 88 BPM | TEMPERATURE: 97.7 F | RESPIRATION RATE: 16 BRPM | SYSTOLIC BLOOD PRESSURE: 138 MMHG | BODY MASS INDEX: 28.52 KG/M2 | DIASTOLIC BLOOD PRESSURE: 77 MMHG | WEIGHT: 155 LBS | OXYGEN SATURATION: 97 % | HEIGHT: 62 IN
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
## 2025-07-05 DIAGNOSIS — S61.210A LACERATION OF RIGHT INDEX FINGER WITHOUT FOREIGN BODY WITHOUT DAMAGE TO NAIL, INITIAL ENCOUNTER: ICD-10-CM

## 2025-07-05 PROCEDURE — 73140 X-RAY EXAM OF FINGER(S): CPT | Mod: RT

## 2025-07-05 PROCEDURE — 2500000005 HC RX 250 GENERAL PHARMACY W/O HCPCS: Performed by: PHYSICIAN ASSISTANT

## 2025-07-05 PROCEDURE — 64450 NJX AA&/STRD OTHER PN/BRANCH: CPT | Mod: F6 | Performed by: PHYSICIAN ASSISTANT

## 2025-07-05 PROCEDURE — 73140 X-RAY EXAM OF FINGER(S): CPT | Mod: RIGHT SIDE | Performed by: RADIOLOGY

## 2025-07-05 PROCEDURE — 99283 EMERGENCY DEPT VISIT LOW MDM: CPT | Mod: 25

## 2025-07-05 PROCEDURE — 2500000004 HC RX 250 GENERAL PHARMACY W/ HCPCS (ALT 636 FOR OP/ED)

## 2025-07-05 PROCEDURE — 2500000001 HC RX 250 WO HCPCS SELF ADMINISTERED DRUGS (ALT 637 FOR MEDICARE OP): Performed by: PHYSICIAN ASSISTANT

## 2025-07-05 RX ORDER — DOXYCYCLINE 100 MG/1
100 CAPSULE ORAL 2 TIMES DAILY
Qty: 14 CAPSULE | Refills: 0 | Status: SHIPPED | OUTPATIENT
Start: 2025-07-05 | End: 2025-07-12

## 2025-07-05 RX ORDER — LIDOCAINE HYDROCHLORIDE 10 MG/ML
10 INJECTION, SOLUTION INFILTRATION; PERINEURAL ONCE
Status: COMPLETED | OUTPATIENT
Start: 2025-07-05 | End: 2025-07-05

## 2025-07-05 RX ORDER — DOXYCYCLINE HYCLATE 100 MG
100 TABLET ORAL ONCE
Status: COMPLETED | OUTPATIENT
Start: 2025-07-05 | End: 2025-07-05

## 2025-07-05 RX ORDER — LIDOCAINE HYDROCHLORIDE 10 MG/ML
INJECTION, SOLUTION INFILTRATION; PERINEURAL
Status: COMPLETED
Start: 2025-07-05 | End: 2025-07-05

## 2025-07-05 RX ORDER — METRONIDAZOLE 500 MG/1
500 TABLET ORAL 3 TIMES DAILY
Qty: 21 TABLET | Refills: 0 | Status: SHIPPED | OUTPATIENT
Start: 2025-07-05 | End: 2025-07-12

## 2025-07-05 RX ORDER — BACITRACIN 500 [USP'U]/G
OINTMENT TOPICAL ONCE
Status: COMPLETED | OUTPATIENT
Start: 2025-07-05 | End: 2025-07-05

## 2025-07-05 RX ORDER — IBUPROFEN 600 MG/1
600 TABLET, FILM COATED ORAL ONCE
Status: COMPLETED | OUTPATIENT
Start: 2025-07-05 | End: 2025-07-05

## 2025-07-05 RX ORDER — METRONIDAZOLE 500 MG/1
500 TABLET ORAL ONCE
Status: COMPLETED | OUTPATIENT
Start: 2025-07-05 | End: 2025-07-05

## 2025-07-05 RX ADMIN — LIDOCAINE HYDROCHLORIDE 10 ML: 10 INJECTION, SOLUTION INFILTRATION; PERINEURAL at 20:34

## 2025-07-05 RX ADMIN — BACITRACIN: 500 OINTMENT TOPICAL at 21:05

## 2025-07-05 RX ADMIN — DOXYCYCLINE HYCLATE 100 MG: 100 TABLET, COATED ORAL at 21:05

## 2025-07-05 RX ADMIN — METRONIDAZOLE 500 MG: 500 TABLET ORAL at 21:05

## 2025-07-05 RX ADMIN — IBUPROFEN 600 MG: 600 TABLET ORAL at 20:34

## 2025-07-05 ASSESSMENT — LIFESTYLE VARIABLES
HAVE YOU EVER FELT YOU SHOULD CUT DOWN ON YOUR DRINKING: NO
EVER HAD A DRINK FIRST THING IN THE MORNING TO STEADY YOUR NERVES TO GET RID OF A HANGOVER: NO
TOTAL SCORE: 0
HAVE PEOPLE ANNOYED YOU BY CRITICIZING YOUR DRINKING: NO
EVER FELT BAD OR GUILTY ABOUT YOUR DRINKING: NO

## 2025-07-05 ASSESSMENT — PAIN DESCRIPTION - PAIN TYPE: TYPE: ACUTE PAIN

## 2025-07-05 ASSESSMENT — PAIN - FUNCTIONAL ASSESSMENT: PAIN_FUNCTIONAL_ASSESSMENT: 0-10

## 2025-07-05 ASSESSMENT — PAIN DESCRIPTION - ORIENTATION: ORIENTATION: RIGHT

## 2025-07-05 ASSESSMENT — PAIN DESCRIPTION - DESCRIPTORS: DESCRIPTORS: SHARP

## 2025-07-05 ASSESSMENT — PAIN DESCRIPTION - LOCATION: LOCATION: FINGER (COMMENT WHICH ONE)

## 2025-07-05 ASSESSMENT — PAIN SCALES - GENERAL
PAINLEVEL_OUTOF10: 5 - MODERATE PAIN
PAINLEVEL_OUTOF10: 0 - NO PAIN

## 2025-07-06 NOTE — ED TRIAGE NOTES
Patient presents with right index dog bite. No thinners, bleeding active but controlled with pressure. RN dressed wound with pressure dressing. MSPs intact, cap refill < 2 sec. Dog was UTD on rabies. Patient unsure if UTD on tetanus. No thinners

## 2025-07-06 NOTE — ED PROVIDER NOTES
HPI   Chief Complaint   Patient presents with    Animal Bite       Is a 50-year-old female coming in for right finger dog bite.  Patient states that her dog as well as another family members dog were fighting when she went to try to break it up and the dog bit the right hand second digit.  She denies any other areas of bite or injury.  She is unsure when her last tetanus was completed.  Due to the bleeding she came in here to be evaluated.  Again denies any other areas of pain or injury.  She is right-hand dominant.      History provided by:  Patient and relative          Patient History   Medical History[1]  Surgical History[2]  Family History[3]  Social History[4]    Physical Exam   ED Triage Vitals [07/05/25 2021]   Temperature Heart Rate Respirations BP   36.6 °C (97.9 °F) (!) 103 16 (!) 142/93      Pulse Ox Temp Source Heart Rate Source Patient Position   97 % Temporal Monitor Sitting      BP Location FiO2 (%)     Left arm --       Physical Exam  Constitutional:       General: She is awake. She is not in acute distress.     Appearance: Normal appearance. She is not ill-appearing, toxic-appearing or diaphoretic.   HENT:      Head: Atraumatic.      Nose: No rhinorrhea.   Eyes:      General: No scleral icterus.        Right eye: No discharge.         Left eye: No discharge.   Pulmonary:      Effort: Pulmonary effort is normal. No respiratory distress.   Musculoskeletal:      Cervical back: Normal range of motion and neck supple.      Comments: There is tender palpation to the distal right hand second digit.  There is a laceration on the dorsal aspect of the finger that is 1 cm in length and 1 cm laceration on the palmar side of the finger near the DIP joint.   Skin:     Findings: Signs of injury and laceration present.   Neurological:      Mental Status: She is alert.      GCS: GCS eye subscore is 4. GCS verbal subscore is 5. GCS motor subscore is 6.      Gait: Gait is intact.           ED Course & MDM   Diagnoses  as of 07/05/25 2119   Dog bite, initial encounter   Laceration of right index finger without foreign body without damage to nail, initial encounter                 No data recorded     Vivienne Coma Scale Score: 15 (07/05/25 2119 : Peggy Park RN)                           Medical Decision Making  Summary:  Medical Decision Making:   Patient presented as described in HPI. Patient case including ROS, PE, and treatment and plan discussed with ED attending if attached as cosigner. Results from labs and or imaging included below if completed. Alice Archer  is a 50 y.o. coming in for Patient presents with:  Animal Bite  .  Patient had a dog bite to the right hand second digit.  The area was evaluated by myself.  A digital block was performed to anesthetize the finger.  Patient then irrigated the wounds under running water for greater than 10 minutes.  On my review and interpretation of the x-rays I do not see any fracture however I am concerned about the dog bite to the area.  Patient will be treated with doxycycline and Flagyl due to the allergies.  She will be discharged and vies follow-up with her PCP in 1 week for further evaluation assessment.  I do not see any signs for fracture or tendon involvement as she is stable and able to flex and extend at the DIP joint.  She is advised on wound care.  Follow-up with the primary care in 1 week and again if she sees signs of a worsening infection she is to repeat her evaluation here in the emergency room.  Patient will be discharged at this time after  antibiotic ointment is applied and oral antibiotics are given.      Disposition is completed with shared decision making with the patient or guardian present with the patient. They were advised to follow up with PCP or recommended provider in 2-3 days for another evaluation and exam. I advised the patient to return or go to closest emergency room immediately if symptoms change, get worse, or new symptoms develop prior to  follow up. I explained the plan and treatment course. Patient/guardian is in agreement with plan, treatment course, and follow up and state that they will comply.    Labs Reviewed - No data to display   XR fingers right 2+ views    (Results Pending)                         Tests/Medications/Escalations of Care considered but not given: As in MDM    Patient care discussed with: N/A  Social Determinants affecting care: N/A    Final diagnosis and disposition as documented     Diagnoses as of 07/05/25 2122  Dog bite, initial encounter  Laceration of right index finger without foreign body without damage to nail, initial encounter       Shared decision making was completed and determined that patient will be discharged. I discussed the differential; results and discharge plan with the patient and/or family/friend/caregiver if present.  I emphasized the importance of follow-up with the physician I referred them to in the timeframe recommended.  I explained reasons for the patient to return to the Emergency Department. They agreed that if they feel their condition is worsening or if they have any other concern they should call 911 immediately for further assistance. I gave the patient an opportunity to ask all questions they had and answered all of them accordingly. They understand return precautions and discharge instructions. The patient and/or family/friend/caregiver expressed understanding verbally and that they would comply.     Disposition: Discharge      This note has been transcribed using voice recognition and may contain grammatical errors, misplaced words, incorrect words, incorrect phrases or other errors.         Procedure  Digital Block    Performed by: Trever Greco PA-C  Authorized by: Trever Greco PA-C    Consent:     Consent obtained:  Verbal    Consent given by:  Patient    Risks, benefits, and alternatives were discussed: yes      Risks discussed:  Pain, infection, swelling, nerve damage,  unsuccessful block, allergic reaction, intravascular injection and bleeding    Alternatives discussed:  No treatment  Universal protocol:     Patient identity confirmed:  Verbally with patient  Indications:     Indications:  Pain relief  Location:     Block location:  Finger    Finger blocked:  R index finger  Pre-procedure details:     Neurovascular status: intact      Skin preparation:  Chlorhexidine  Procedure details:     Needle gauge:  27 G    Anesthetic injected:  Lidocaine 1% w/o epi    Technique:  Four-sided ring block    Injection procedure:  Anatomic landmarks identified  Post-procedure details:     Outcome:  Anesthesia achieved    Procedure completion:  Tolerated well, no immediate complications         [1]   Past Medical History:  Diagnosis Date    Cutaneous abscess of left axilla 08/29/2016    Cutaneous abscess of left axilla    Encounter for insertion of intrauterine contraceptive device 12/14/2017    Encounter for insertion of intrauterine contraceptive device (IUD)    Encounter for screening for malignant neoplasm, site unspecified 11/04/2014    Encounter for Pap smear    Hypertension     Localized swelling, mass and lump, left upper limb 06/24/2020    Mass of left axilla    Other conditions influencing health status     Dysplasia    Pain in thumb joint with movement 08/23/2023    Personal history of other endocrine, nutritional and metabolic disease     History of hypokalemia    Personal history of other infectious and parasitic diseases     History of chickenpox    Personal history of other infectious and parasitic diseases     History of HPV infection    Personal history of other specified conditions 10/10/2018    History of diarrhea    Sprain of thoracic region 03/25/2011    Unspecified symptoms and signs involving the genitourinary system 08/06/2020    UTI symptoms    Urinary tract infection, site not specified 11/10/2016    Acute lower UTI   [2]   Past Surgical History:  Procedure Laterality  Date    BUNIONECTOMY      CERVICAL BIOPSY  W/ LOOP ELECTRODE EXCISION  2014    Cervical Loop Electrosurgical Excision (LEEP)     SECTION, LOW TRANSVERSE      FOOT SURGERY  2014    Foot Surgery    OTHER SURGICAL HISTORY  2014    Colposcopy Cervix With Biopsy(S)   [3]   Family History  Problem Relation Name Age of Onset    COPD Mother      Febrile seizures Mother      Hyperlipidemia Father      Hyperlipidemia Paternal Grandfather     [4]   Social History  Tobacco Use    Smoking status: Never    Smokeless tobacco: Never   Vaping Use    Vaping status: Never Used   Substance Use Topics    Alcohol use: Yes     Comment: 3-12 it varies    Drug use: Never        Trever Greco PA-C  [phone]

## 2025-07-07 ENCOUNTER — TELEPHONE (OUTPATIENT)
Dept: PRIMARY CARE | Facility: CLINIC | Age: 50
End: 2025-07-07
Payer: COMMERCIAL

## 2025-07-07 ENCOUNTER — TELEPHONE (OUTPATIENT)
Dept: PRIMARY CARE | Facility: CLINIC | Age: 50
End: 2025-07-07

## 2025-07-07 DIAGNOSIS — I10 BENIGN HYPERTENSION: ICD-10-CM

## 2025-07-07 NOTE — TELEPHONE ENCOUNTER
PT was in ED 07/05/25 for a dog bite on her finger, no admit. PT was given two antibiotics to take and states she was told to make a F/U with PCP regarding the bite.    PT was asked about their current condition, PT stated she was feeling better and the bite was slightly painful.    Does the PT need to be seen.

## 2025-07-07 NOTE — TELEPHONE ENCOUNTER
Medication Refill    Pharmacy: [ [facility] ]    Medication: [ Losartan 50 mg ]    Quantity: [ 90 day ]    LOV: [ 07/09/24 ]    NOV: [ none ]

## 2025-07-08 DIAGNOSIS — I10 BENIGN HYPERTENSION: ICD-10-CM

## 2025-07-08 RX ORDER — LOSARTAN POTASSIUM 50 MG/1
50 TABLET ORAL DAILY
Qty: 90 TABLET | Refills: 0 | Status: SHIPPED | OUTPATIENT
Start: 2025-07-08

## 2025-07-08 RX ORDER — TRIAMTERENE AND HYDROCHLOROTHIAZIDE 37.5; 25 MG/1; MG/1
1 TABLET ORAL DAILY
Qty: 90 TABLET | Refills: 0 | Status: SHIPPED | OUTPATIENT
Start: 2025-07-08

## 2025-07-08 RX ORDER — LOSARTAN POTASSIUM 50 MG/1
TABLET ORAL
Qty: 90 TABLET | Refills: 0 | Status: SHIPPED | OUTPATIENT
Start: 2025-07-08 | End: 2025-07-09 | Stop reason: SDUPTHER

## 2025-07-09 ENCOUNTER — OFFICE VISIT (OUTPATIENT)
Dept: PRIMARY CARE | Facility: CLINIC | Age: 50
End: 2025-07-09
Payer: COMMERCIAL

## 2025-07-09 VITALS
TEMPERATURE: 98.1 F | DIASTOLIC BLOOD PRESSURE: 84 MMHG | SYSTOLIC BLOOD PRESSURE: 143 MMHG | HEART RATE: 74 BPM | OXYGEN SATURATION: 98 % | BODY MASS INDEX: 37.49 KG/M2 | WEIGHT: 205 LBS
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
## 2025-07-09 DIAGNOSIS — S61.258A DOG BITE OF INDEX FINGER, INITIAL ENCOUNTER: Primary | ICD-10-CM

## 2025-07-09 PROCEDURE — 3079F DIAST BP 80-89 MM HG: CPT | Performed by: FAMILY MEDICINE

## 2025-07-09 PROCEDURE — 3077F SYST BP >= 140 MM HG: CPT | Performed by: FAMILY MEDICINE

## 2025-07-09 PROCEDURE — 99213 OFFICE O/P EST LOW 20 MIN: CPT | Performed by: FAMILY MEDICINE

## 2025-07-09 PROCEDURE — 1036F TOBACCO NON-USER: CPT | Performed by: FAMILY MEDICINE

## 2025-07-09 ASSESSMENT — ENCOUNTER SYMPTOMS: WOUND: 1

## 2025-07-09 NOTE — PROGRESS NOTES
Subjective   Patient ID: Alice Archer is a 50 y.o. female who presents for Follow-up (Pt presents for F/U ER dog bite R pointer finger x 4 days ago.).  HPI Historian(s): Self    Saturday dog bite, went immediately to the ER.     Review of Systems   Skin:  Positive for wound.   All other systems reviewed and are negative.    No LMP recorded. Patient has had an implant.    Tobacco Use History[1]  Social History     Substance and Sexual Activity   Alcohol Use Yes    Comment: 3-12 it varies     Social History     Substance and Sexual Activity   Drug Use Never       Family History[2]    Patient Care Team:  Alfonzo Meléndez DO as PCP - General (Family Medicine)  Alfonzo Meléndez DO as PCP - [facility] PCP  DO Milady Tompkins MD as Obstetrician (Obstetrics and Gynecology)    RX Allergies[3]    Prior to Admission medications    Medication Sig Start Date End Date Taking? Authorizing Provider   doxycycline (Vibramycin) 100 mg capsule Take 1 capsule (100 mg) by mouth 2 times a day for 7 days. Take with at least 8 ounces (large glass) of water, do not lie down for 30 minutes after 7/5/25 7/12/25 Yes Trever Greco PA-C   levonorgestrel (Mirena) 21 mcg/24 hours (8 yrs) 52 mg IUD Mirena (52 MG) 20 MCG/24HR IUD   Refills: 0       Active   Yes Historical Provider, MD   losartan (Cozaar) 50 mg tablet Take 1 tablet (50 mg) by mouth once daily. 7/8/25  Yes Alfonzo Meléndez DO   metroNIDAZOLE (Flagyl) 500 mg tablet Take 1 tablet (500 mg) by mouth 3 times a day for 7 days. 7/5/25 7/12/25 Yes Trever Greco PA-C   triamterene-hydrochlorothiazid (Maxzide-25) 37.5-25 mg tablet TAKE ONE TABLET BY MOUTH DAILY 7/8/25  Yes Alfonzo Meléndez DO   capsaicin (Zostrix) 0.025 % cream Apply topically 2 times a day.  Patient not taking: Reported on 7/9/2025 8/27/24 8/27/25  Paul Higginbotham MD   gabapentin (Neurontin) 300 mg capsule Take 1 capsule (300 mg) by mouth once daily at bedtime.  Patient not taking: Reported  on 7/9/2025 8/27/24 11/25/24  Paul Higginbotham MD   losartan (Cozaar) 50 mg tablet TAKE ONE TABLET (50 MG) BY MOUTH EVERY DAY  Patient not taking: Reported on 7/9/2025 7/8/25   Alfonzo B Gerrydon, DO   losartan (Cozaar) 50 mg tablet Take 1 tablet (50 mg) by mouth once daily. 7/9/24 7/7/25  Alfonzo WATERS GerryDO dante   triamterene-hydrochlorothiazid (Maxzide-25) 37.5-25 mg tablet Take 1 tablet by mouth once daily. 7/9/24 7/8/25  Alfonzo Meléndez DO        Objective     Vitals:    07/09/25 1535   BP: 143/84   Pulse: 74   Temp: 36.7 °C (98.1 °F)   SpO2: 98%   Weight: 93 kg (205 lb)              Physical Exam  Vitals and nursing note reviewed.   Constitutional:       General: She is not in acute distress.     Appearance: Normal appearance.      Comments: No assistive device presently being used.   HENT:      Head: Normocephalic and atraumatic.   Eyes:      General: No scleral icterus.     Extraocular Movements: Extraocular movements intact.      Conjunctiva/sclera: Conjunctivae normal.   Pulmonary:      Effort: Pulmonary effort is normal. No respiratory distress.   Musculoskeletal:         General: Signs of injury (right index finger stiff due to swelling, no obvious instability on exam limited by pain) present.   Skin:     General: Skin is warm and dry.      Coloration: Skin is not jaundiced.      Findings: Lesion (right index finger irregular laceration dorsally, medially, and ventrally, no ssi, no purulent discharge; slight numbness, brisk and equal capillary refill) present.   Neurological:      Mental Status: She is alert and oriented to person, place, and time. Mental status is at baseline.   Psychiatric:         Behavior: Behavior normal.         Assessment & Plan  Dog bite of index finger, initial encounter  Declines tetanus booster, explained contaminated/high-risk wound, >= 5y. Discussed s/s infection, compartment syndrome, ischemia, necrosis, nerve injury. Follow-up with hand surgeon if symptoms or ROM fail to  improve. Reports dog will be monitored.       Denies h/o RAD, asthma, COPD.                    [1]   Social History  Tobacco Use   Smoking Status Never   Smokeless Tobacco Never   [2]   Family History  Problem Relation Name Age of Onset    COPD Mother      Febrile seizures Mother      Hyperlipidemia Father      Hyperlipidemia Paternal Grandfather     [3]   Allergies  Allergen Reactions    Oxycodone-Acetaminophen Hives and Unknown    Sulfa (Sulfonamide Antibiotics) Swelling    Erythromycin Other and Unknown     This was from childhood    Amoxicillin Other and Rash    Clarithromycin Rash and Unknown    Loracarbef Rash     She doesn't recall the severity.    Penicillins Hives, Other and Rash     Severity of the rash is not recalled.    Sulfamethoxazole-Trimethoprim Swelling, Unknown and Rash

## 2025-07-09 NOTE — PATIENT INSTRUCTIONS
Hand Surgery  Dr. Jayden Gonzalez ([place]) [phone]  Dr. Jorge Shook ([place]) [phone]  Dr. Buck Alcantara ([facility]) [phone]  [facility] [phone]    Please return or seek medical attention if symptoms persist, change, worsen, or return. For emergencies, call 9-1-1 or go to the nearest Emergency Room. Please return for your next Wellness visit within the next 1-2 months, or 12 months after your last Wellness visit. Please schedule additional problem-focused appointment(s) to address additional problem(s). Simply mentioning or talking briefly about a problem or concern does not necessarily mean it is currently being addressed. Time constraints dictate that not every problem/concern can always be addressed.    Avoid taking Biotin (a vitamin, shows up particularly in hair/nail supplements) for a week prior to any blood tests, as it can interfere with certain results. Fasting for labs means 12 hours, nothing to eat or drink, except water and medications, unless directed otherwise.    For assistance with scheduling referrals or consultations, please call [phone]. For laboratory, radiology, and other tests, please call [phone] ([phone] for pediatrics). Please review prescription inserts and published information for possible adverse effects of all medications. Return after testing or consultation to review results and recommendations, if symptoms persist, change, worsen, or return, or if you have any question or concern. If you do not get results within 7-10 days, or you have any question or concern, please send a message, call the office ([phone]), or return to the office for a follow-up appointment. For non-emergencies, you may call the office. For emergencies, call 9-1-1 or go to the nearest Emergency Department. Please schedule additional appointment(s) to address concern(s) not addressed today. An annual Wellness visit is strongly recommended. A Wellness  visit should be dedicated to addressing routine health maintenance matters (e.g., cancer screenings, cardiovascular screening, etc.). Problem-focused visits, typically prompted by symptoms or specific concerns, are usually conducted separately, particularly if multiple or complex problems need to be addressed.    In general, results are not released or discussed over the telephone, but at an appointment or via  [employer]. Results of tests done through [facility] are released via  [employer] (see below).  [email or web address]   [employer] support line: [phone]

## 2025-07-17 ENCOUNTER — APPOINTMENT (OUTPATIENT)
Dept: PRIMARY CARE | Facility: CLINIC | Age: 50
End: 2025-07-17
Payer: COMMERCIAL

## 2025-07-17 VITALS
SYSTOLIC BLOOD PRESSURE: 139 MMHG | BODY MASS INDEX: 37.17 KG/M2 | WEIGHT: 202 LBS | HEIGHT: 62 IN | HEART RATE: 96 BPM | DIASTOLIC BLOOD PRESSURE: 90 MMHG | OXYGEN SATURATION: 99 %

## 2025-07-17 DIAGNOSIS — Z00.00 WELL ADULT HEALTH CHECK: Primary | ICD-10-CM

## 2025-07-17 DIAGNOSIS — S61.258D DOG BITE OF INDEX FINGER, SUBSEQUENT ENCOUNTER: ICD-10-CM

## 2025-07-17 DIAGNOSIS — M72.2 PLANTAR FASCIITIS, BILATERAL: ICD-10-CM

## 2025-07-17 DIAGNOSIS — I10 BENIGN HYPERTENSION: ICD-10-CM

## 2025-07-17 DIAGNOSIS — Z12.31 BREAST CANCER SCREENING BY MAMMOGRAM: ICD-10-CM

## 2025-07-17 DIAGNOSIS — E78.00 HYPERCHOLESTEROLEMIA: ICD-10-CM
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
## 2025-07-17 PROCEDURE — 3075F SYST BP GE 130 - 139MM HG: CPT | Performed by: FAMILY MEDICINE

## 2025-07-17 PROCEDURE — 3080F DIAST BP >= 90 MM HG: CPT | Performed by: FAMILY MEDICINE

## 2025-07-17 PROCEDURE — 99214 OFFICE O/P EST MOD 30 MIN: CPT | Performed by: FAMILY MEDICINE

## 2025-07-17 PROCEDURE — 99396 PREV VISIT EST AGE 40-64: CPT | Performed by: FAMILY MEDICINE

## 2025-07-17 PROCEDURE — 1036F TOBACCO NON-USER: CPT | Performed by: FAMILY MEDICINE

## 2025-07-17 PROCEDURE — 3008F BODY MASS INDEX DOCD: CPT | Performed by: FAMILY MEDICINE

## 2025-07-17 RX ORDER — TRIAMTERENE AND HYDROCHLOROTHIAZIDE 37.5; 25 MG/1; MG/1
1 TABLET ORAL DAILY
Qty: 90 TABLET | Refills: 3 | Status: SHIPPED | OUTPATIENT
Start: 2025-07-17

## 2025-07-17 RX ORDER — LOSARTAN POTASSIUM 50 MG/1
50 TABLET ORAL DAILY
Qty: 90 TABLET | Refills: 3 | Status: SHIPPED | OUTPATIENT
Start: 2025-07-17

## 2025-07-17 ASSESSMENT — PATIENT HEALTH QUESTIONNAIRE - PHQ9
1. LITTLE INTEREST OR PLEASURE IN DOING THINGS: NOT AT ALL
2. FEELING DOWN, DEPRESSED OR HOPELESS: NOT AT ALL
SUM OF ALL RESPONSES TO PHQ9 QUESTIONS 1 AND 2: 0

## 2025-07-17 ASSESSMENT — ENCOUNTER SYMPTOMS: WOUND: 1

## 2025-07-17 NOTE — PATIENT INSTRUCTIONS
"Please follow-up with your gynecologist for routine cervical cancer screening (Pap test).    Hand Surgery  Dr. Jayden Gonzalez ([place]) [phone]  Dr. Jorge Shook ([place]) [phone]  Dr. Buck Alcantara ([facility]) [phone]  [facility] [phone]     Please return for your next Wellness visit in 12 months. Please schedule additional problem-focused appointment(s) to address additional problem(s). Simply mentioning or talking briefly about a problem or concern does not necessarily mean it is currently being addressed. Time constraints dictate that not every problem/concern can always be addressed.    Recommended vaccines:  Influenza, annual  Prevnar-20 \"pneumonia\" vaccine  Shingrix (shingles) vaccine series  Avoid taking Biotin (a vitamin, shows up particularly in hair/nail supplements) for a week prior to any blood tests, as it can interfere with certain results. Fasting for labs means 12 hours, nothing to eat or drink, except water and medications, unless directed otherwise.    For assistance with scheduling referrals or consultations, please call [phone]. For laboratory, radiology, and other tests, please call [phone] ([phone] for pediatrics). Please review prescription inserts and published information for possible adverse effects of all medications. Return after testing or consultation to review results and recommendations, if symptoms persist, change, worsen, or return, or if you have any question or concern. If you do not get results within 7-10 days, or you have any question or concern, please send a message, call the office ([phone]), or return to the office for a follow-up appointment. For non-emergencies, you may call the office. For emergencies, call 9-1-1 or go to the nearest Emergency Department. Please schedule additional appointment(s) to address concern(s) not addressed today. An annual Wellness visit is strongly recommended. A Wellness visit " should be dedicated to addressing routine health maintenance matters (e.g., cancer screenings, cardiovascular screening, etc.). Problem-focused visits, typically prompted by symptoms or specific concerns, are usually conducted separately, particularly if multiple or complex problems need to be addressed.    In general, results are not released or discussed over the telephone, but at an appointment or via  [employer]. Results of tests done through [facility] are released via  [employer] (see below).  [email or web address]   [employer] support line: [phone]

## 2025-07-17 NOTE — ASSESSMENT & PLAN NOTE
Well controlled.   Orders:    losartan (Cozaar) 50 mg tablet; Take 1 tablet (50 mg) by mouth once daily.    triamterene-hydrochlorothiazid (Maxzide-25) 37.5-25 mg tablet; Take 1 tablet by mouth once daily.

## 2025-07-17 NOTE — PROGRESS NOTES
Subjective   Patient ID: Alice Archer is a 50 y.o. female who presents for Follow-up (Med review).  HPI Historian(s): Self    Generally feeling well.    c/o bilateral heel pain R>L.    Review of Systems   Skin:  Positive for wound.   All other systems reviewed and are negative.    No LMP recorded. Patient has had an implant.    Tobacco Use History[1]  Social History     Substance and Sexual Activity   Alcohol Use Yes    Comment: 3-12 it varies     Social History     Substance and Sexual Activity   Drug Use Never       Family History[2]    Patient Care Team:  Alfonzo Meléndez DO as PCP - General (Family Medicine)  Alfonzo Meléndez DO as PCP - [facility] PCP  DO Milady Tompkins MD as Obstetrician (Obstetrics and Gynecology)    RX Allergies[3]    Prior to Admission medications   Medication Sig Start Date End Date Taking? Authorizing Provider   capsaicin (Zostrix) 0.025 % cream Apply topically 2 times a day.  Patient not taking: Reported on 7/9/2025 8/27/24 8/27/25  Paul Higginbotham MD   doxycycline (Vibramycin) 100 mg capsule Take 1 capsule (100 mg) by mouth 2 times a day for 7 days. Take with at least 8 ounces (large glass) of water, do not lie down for 30 minutes after 7/5/25 7/12/25  Trever Greco PA-C   gabapentin (Neurontin) 300 mg capsule Take 1 capsule (300 mg) by mouth once daily at bedtime.  Patient not taking: Reported on 7/9/2025 8/27/24 11/25/24  Paul Higginbotham MD   levonorgestrel (Mirena) 21 mcg/24 hours (8 yrs) 52 mg IUD Mirena (52 MG) 20 MCG/24HR IUD   Refills: 0       Active    Historical Provider, MD   losartan (Cozaar) 50 mg tablet Take 1 tablet (50 mg) by mouth once daily. 7/8/25   Alfonzo Meléndez DO   metroNIDAZOLE (Flagyl) 500 mg tablet Take 1 tablet (500 mg) by mouth 3 times a day for 7 days. 7/5/25 7/12/25  Trever Greco PA-C   triamterene-hydrochlorothiazid (Maxzide-25) 37.5-25 mg tablet TAKE ONE TABLET BY MOUTH DAILY 7/8/25   Alfonzo Meléndez, DO     "    Objective     Vitals:    07/17/25 0805   BP: 139/90   Pulse: 96   SpO2: 99%   Weight: 91.6 kg (202 lb)   Height: 1.575 m (5' 2\")        Lab Results   Component Value Date    LDLCALC 146 (H) 01/06/2024    LDLF 116 (H) 06/22/2021    LDLF 121 (H) 07/14/2020    LDLF 119 (H) 10/12/2018     Lab Results   Component Value Date    TRIG 153 (H) 01/06/2024    TRIG 172 (H) 06/22/2021    TRIG 132 07/14/2020      Lab Results   Component Value Date    CREATININE 0.70 01/06/2024    CREATININE 0.72 06/22/2021    CREATININE 0.67 07/14/2020    CREATININE 0.74 10/12/2018     Lab Results   Component Value Date    EGFR >90 01/06/2024      Lab Results   Component Value Date    TSH 1.18 01/06/2024    TSH 2.19 06/22/2021    TSH 2.39 07/14/2020    TSH 1.84 10/12/2018          Physical Exam  Vitals and nursing note reviewed.   Constitutional:       General: She is not in acute distress.     Appearance: Normal appearance. She is well-developed.      Comments: No assistive device presently being used.   HENT:      Head: Normocephalic and atraumatic.      Nose: Nose normal.     Eyes:      General: No scleral icterus.     Extraocular Movements: Extraocular movements intact.      Conjunctiva/sclera: Conjunctivae normal.     Neck:      Thyroid: No thyromegaly.      Vascular: No carotid bruit or JVD.     Cardiovascular:      Rate and Rhythm: Normal rate and regular rhythm.      Heart sounds: Normal heart sounds.   Pulmonary:      Effort: Pulmonary effort is normal. No respiratory distress.      Breath sounds: Normal breath sounds.   Abdominal:      General: Bowel sounds are normal. There is no distension.      Palpations: Abdomen is soft. There is no mass.      Tenderness: There is no abdominal tenderness. There is no guarding or rebound.     Musculoskeletal:         General: Tenderness (bilateral calcaneal insertions of plantar fascia) and signs of injury (improving but significantly limited flexion of index finger related to dog bite) present. "      Cervical back: Normal range of motion. No tenderness.      Right lower leg: No edema.      Left lower leg: No edema.     Skin:     General: Skin is warm and dry.      Coloration: Skin is not jaundiced.      Findings: Lesion (index finger bite wound healing appropriately, no ssi) present.     Neurological:      General: No focal deficit present.      Mental Status: She is alert and oriented to person, place, and time. Mental status is at baseline.     Psychiatric:         Mood and Affect: Mood normal.         Behavior: Behavior normal.         Thought Content: Thought content normal.         Assessment & Plan  Well adult health check  50yF doing fairly well.       Benign hypertension  Well controlled.   Orders:    losartan (Cozaar) 50 mg tablet; Take 1 tablet (50 mg) by mouth once daily.    triamterene-hydrochlorothiazid (Maxzide-25) 37.5-25 mg tablet; Take 1 tablet by mouth once daily.    Hypercholesterolemia  Therapeutic lifestyle changes.       Dog bite of index finger, subsequent encounter  Healing well. ROM improving slightly.  Orders:    Referral to Occupational Therapy; Future    Plantar fasciitis, bilateral  Ice massages, stretches.       Breast cancer screening by mammogram    Orders:    BI mammo bilateral screening tomosynthesis; Future                         [1]   Social History  Tobacco Use   Smoking Status Never   Smokeless Tobacco Never   [2]   Family History  Problem Relation Name Age of Onset    COPD Mother      Febrile seizures Mother      Hyperlipidemia Father      Uterine cancer Paternal Grandmother      Hyperlipidemia Paternal Grandfather      Bladder cancer (Multi) Paternal Grandfather     [3]   Allergies  Allergen Reactions    Oxycodone-Acetaminophen Hives and Unknown    Sulfa (Sulfonamide Antibiotics) Swelling    Erythromycin Other and Unknown     This was from childhood    Amoxicillin Other and Rash    Clarithromycin Rash and Unknown    Loracarbef Rash     She doesn't recall the  severity.    Penicillins Hives, Other and Rash     Severity of the rash is not recalled.    Sulfamethoxazole-Trimethoprim Rash, Swelling and Unknown

## 2025-07-31 ENCOUNTER — OFFICE VISIT (OUTPATIENT)
Dept: PRIMARY CARE | Facility: CLINIC | Age: 50
End: 2025-07-31
Payer: COMMERCIAL

## 2025-07-31 ENCOUNTER — TELEPHONE (OUTPATIENT)
Dept: PRIMARY CARE | Facility: CLINIC | Age: 50
End: 2025-07-31

## 2025-07-31 ENCOUNTER — HOSPITAL ENCOUNTER (OUTPATIENT)
Dept: RADIOLOGY | Facility: CLINIC | Age: 50
Discharge: HOME | End: 2025-07-31
Payer: COMMERCIAL

## 2025-07-31 VITALS
HEART RATE: 90 BPM | SYSTOLIC BLOOD PRESSURE: 138 MMHG | HEIGHT: 62 IN | BODY MASS INDEX: 36.62 KG/M2 | TEMPERATURE: 98.5 F | DIASTOLIC BLOOD PRESSURE: 88 MMHG | OXYGEN SATURATION: 97 % | WEIGHT: 199 LBS
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
## 2025-07-31 DIAGNOSIS — L03.011 CELLULITIS OF FINGER OF RIGHT HAND: ICD-10-CM
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
## 2025-07-31 PROCEDURE — 73130 X-RAY EXAM OF HAND: CPT | Mod: RIGHT SIDE | Performed by: RADIOLOGY

## 2025-07-31 PROCEDURE — 3008F BODY MASS INDEX DOCD: CPT | Performed by: NURSE PRACTITIONER

## 2025-07-31 PROCEDURE — 3075F SYST BP GE 130 - 139MM HG: CPT | Performed by: NURSE PRACTITIONER

## 2025-07-31 PROCEDURE — 1036F TOBACCO NON-USER: CPT | Performed by: NURSE PRACTITIONER

## 2025-07-31 PROCEDURE — 73130 X-RAY EXAM OF HAND: CPT | Mod: RT

## 2025-07-31 PROCEDURE — 99213 OFFICE O/P EST LOW 20 MIN: CPT | Performed by: NURSE PRACTITIONER

## 2025-07-31 PROCEDURE — 3079F DIAST BP 80-89 MM HG: CPT | Performed by: NURSE PRACTITIONER

## 2025-07-31 RX ORDER — DOXYCYCLINE 100 MG/1
100 CAPSULE ORAL 2 TIMES DAILY
Qty: 14 CAPSULE | Refills: 0 | Status: SHIPPED | OUTPATIENT
Start: 2025-07-31 | End: 2025-08-07

## 2025-07-31 RX ORDER — METRONIDAZOLE 500 MG/1
500 TABLET ORAL 2 TIMES DAILY
Qty: 14 TABLET | Refills: 0 | Status: SHIPPED | OUTPATIENT
Start: 2025-07-31 | End: 2025-08-07

## 2025-07-31 ASSESSMENT — PATIENT HEALTH QUESTIONNAIRE - PHQ9
2. FEELING DOWN, DEPRESSED OR HOPELESS: NOT AT ALL
1. LITTLE INTEREST OR PLEASURE IN DOING THINGS: NOT AT ALL
SUM OF ALL RESPONSES TO PHQ9 QUESTIONS 1 AND 2: 0

## 2025-07-31 ASSESSMENT — ENCOUNTER SYMPTOMS
WOUND: 1
FEVER: 0
JOINT SWELLING: 1
CONSTITUTIONAL NEGATIVE: 1
CHILLS: 0
ARTHRALGIAS: 1

## 2025-07-31 NOTE — PROGRESS NOTES
"Subjective   Patient ID: Alice Archer is a 50 y.o. female who presents for Sick Visit (Pt broke up a dog fight on July 4th and went to the ER where it was treated and she was given antibiotics. Pt states significant swelling started 2 days ago. ).    Patient seen today due to infection concerns following a dog bite earlier this month.  Patient presented to the emergency room initially after breaking up a dog fight between 2 dogs on 4 July.  Evaluation was completed and patient was discharged home on doxycycline and Flagyl as she has multiple medication allergies.  She states that she was tolerating the antibiotics well and the wound looked good until approximately 2 days ago when increased swelling/discomfort has been reported.  She states that the wound is no longer draining but is quite swollen and difficult to bend.  No reported fever, chills or other systemic concerns.  Patient has been keeping the wound dry and wrapped.  Medications reviewed.  No other acute concerns voiced at this time.         Medications Ordered Prior to Encounter[1]    Medical History[2]     Surgical History[3]     Family History[4]     Review of Systems   Constitutional: Negative.  Negative for chills and fever.   Musculoskeletal:  Positive for arthralgias and joint swelling.        See HPI   Skin:  Positive for wound. Negative for pallor and rash.       Objective   /88   Pulse 90   Temp 36.9 °C (98.5 °F)   Ht 1.575 m (5' 2\")   Wt 90.3 kg (199 lb)   SpO2 97%   BMI 36.40 kg/m²     Physical Exam  Constitutional:       General: She is not in acute distress.     Appearance: Normal appearance. She is not toxic-appearing.   HENT:      Head: Normocephalic and atraumatic.      Right Ear: External ear normal.      Left Ear: External ear normal.      Nose: Nose normal.      Mouth/Throat:      Mouth: Mucous membranes are moist.      Pharynx: Oropharynx is clear.     Eyes:      Extraocular Movements: Extraocular movements intact.      " Conjunctiva/sclera: Conjunctivae normal.     Pulmonary:      Effort: Pulmonary effort is normal.   Abdominal:      General: Bowel sounds are normal.     Musculoskeletal:         General: Swelling and signs of injury present.        Arms:       Cervical back: Normal range of motion and neck supple.      Comments: First joint of right index finger swollen/tender to touch with some mild erythema and warmth.  No current drainage     Skin:     General: Skin is warm and dry.     Neurological:      General: No focal deficit present.      Mental Status: She is alert and oriented to person, place, and time. Mental status is at baseline.      Cranial Nerves: No cranial nerve deficit.      Motor: No weakness.     Psychiatric:         Mood and Affect: Mood normal.         Behavior: Behavior normal.         Thought Content: Thought content normal.         Judgment: Judgment normal.         Assessment/Plan   Problem List Items Addressed This Visit    None  Visit Diagnoses         Codes      Dog bite, subsequent encounter    -  Primary W54.0XXD    Relevant Medications    doxycycline (Vibramycin) 100 mg capsule    metroNIDAZOLE (Flagyl) 500 mg tablet    Other Relevant Orders    XR hand right 1-2 views    Referral to Infectious Disease      Cellulitis of finger of right hand     L03.011    Relevant Medications    doxycycline (Vibramycin) 100 mg capsule    metroNIDAZOLE (Flagyl) 500 mg tablet    Other Relevant Orders    XR hand right 1-2 views    Referral to Infectious Disease          Will extend course of oral doxycycline and Flagyl due to what appears to be persistent infection concerns.  Also ordered repeat hand x-ray to assess for any additional underlying pathology.  Referral also placed for infectious disease if concerns persist despite additional course of antibiotics.  Discussed red flags symptoms with patient and when to return the emergency department.  Patient voiced understanding and is in agreement to plan of care.             [1]   Current Outpatient Medications on File Prior to Visit   Medication Sig Dispense Refill    levonorgestrel (Mirena) 21 mcg/24 hours (8 yrs) 52 mg IUD Mirena (52 MG) 20 MCG/24HR IUD   Refills: 0       Active      losartan (Cozaar) 50 mg tablet Take 1 tablet (50 mg) by mouth once daily. 90 tablet 3    triamterene-hydrochlorothiazid (Maxzide-25) 37.5-25 mg tablet Take 1 tablet by mouth once daily. 90 tablet 3     No current facility-administered medications on file prior to visit.   [2]   Past Medical History:  Diagnosis Date    Cutaneous abscess of left axilla 2016    Cutaneous abscess of left axilla    Encounter for insertion of intrauterine contraceptive device 2017    Encounter for insertion of intrauterine contraceptive device (IUD)    Encounter for screening for malignant neoplasm, site unspecified 2014    Encounter for Pap smear    Hypertension     Localized swelling, mass and lump, left upper limb 2020    Mass of left axilla    Other conditions influencing health status     Dysplasia    Pain in thumb joint with movement 2023    Personal history of other endocrine, nutritional and metabolic disease     History of hypokalemia    Personal history of other infectious and parasitic diseases     History of chickenpox    Personal history of other infectious and parasitic diseases     History of HPV infection    Personal history of other specified conditions 10/10/2018    History of diarrhea    Sprain of thoracic region 2011    Unspecified symptoms and signs involving the genitourinary system 2020    UTI symptoms    Urinary tract infection, site not specified 11/10/2016    Acute lower UTI   [3]   Past Surgical History:  Procedure Laterality Date    BUNIONECTOMY      CERVICAL BIOPSY  W/ LOOP ELECTRODE EXCISION  2014    Cervical Loop Electrosurgical Excision (LEEP)     SECTION, LOW TRANSVERSE      FOOT SURGERY  2014    Foot Surgery    OTHER SURGICAL  HISTORY  08/25/2014    Colposcopy Cervix With Biopsy(S)   [4]   Family History  Problem Relation Name Age of Onset    COPD Mother      Febrile seizures Mother      Hyperlipidemia Father      Uterine cancer Paternal Grandmother      Hyperlipidemia Paternal Grandfather      Bladder cancer (Multi) Paternal Grandfather

## 2025-08-06 ENCOUNTER — APPOINTMENT (OUTPATIENT)
Dept: RADIOLOGY | Facility: HOSPITAL | Age: 50
End: 2025-08-06
Payer: COMMERCIAL

## 2025-08-08 ENCOUNTER — TELEPHONE (OUTPATIENT)
Dept: PRIMARY CARE | Facility: CLINIC | Age: 50
End: 2025-08-08
Payer: COMMERCIAL
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
## 2025-08-08 DIAGNOSIS — S61.258D DOG BITE OF INDEX FINGER, SUBSEQUENT ENCOUNTER: Primary | ICD-10-CM

## 2025-08-12 ENCOUNTER — OFFICE VISIT (OUTPATIENT)
Dept: ORTHOPEDIC SURGERY | Facility: CLINIC | Age: 50
End: 2025-08-12
Payer: COMMERCIAL

## 2025-08-12 ENCOUNTER — HOSPITAL ENCOUNTER (OUTPATIENT)
Dept: RADIOLOGY | Facility: HOSPITAL | Age: 50
Discharge: HOME | End: 2025-08-12
Payer: COMMERCIAL
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
## 2025-08-12 DIAGNOSIS — M86.9 OSTEOMYELITIS OF FINGER OF RIGHT HAND (MULTI): Primary | ICD-10-CM

## 2025-08-12 DIAGNOSIS — S61.258D DOG BITE OF INDEX FINGER, SUBSEQUENT ENCOUNTER: ICD-10-CM

## 2025-08-12 PROCEDURE — 99203 OFFICE O/P NEW LOW 30 MIN: CPT | Performed by: ORTHOPAEDIC SURGERY

## 2025-08-12 PROCEDURE — 73140 X-RAY EXAM OF FINGER(S): CPT | Mod: RIGHT SIDE | Performed by: STUDENT IN AN ORGANIZED HEALTH CARE EDUCATION/TRAINING PROGRAM

## 2025-08-12 PROCEDURE — 1036F TOBACCO NON-USER: CPT | Performed by: ORTHOPAEDIC SURGERY

## 2025-08-12 PROCEDURE — 73140 X-RAY EXAM OF FINGER(S): CPT | Mod: RT

## 2025-08-12 ASSESSMENT — PAIN - FUNCTIONAL ASSESSMENT: PAIN_FUNCTIONAL_ASSESSMENT: 0-10

## 2025-08-12 ASSESSMENT — ENCOUNTER SYMPTOMS
WHEEZING: 0
NUMBNESS: 0
FATIGUE: 0
FEVER: 0
BRUISES/BLEEDS EASILY: 0
SHORTNESS OF BREATH: 0
CHILLS: 0

## 2025-08-12 ASSESSMENT — PAIN SCALES - GENERAL: PAINLEVEL_OUTOF10: 3

## 2025-08-13 ENCOUNTER — APPOINTMENT (OUTPATIENT)
Facility: CLINIC | Age: 50
End: 2025-08-13
Payer: COMMERCIAL

## 2025-08-13 LAB
BASOPHILS # BLD AUTO: 68 CELLS/UL (ref 0–200)
BASOPHILS NFR BLD AUTO: 0.8 %
CRP SERPL-MCNC: 6.1 MG/L
EOSINOPHIL # BLD AUTO: 179 CELLS/UL (ref 15–500)
EOSINOPHIL NFR BLD AUTO: 2.1 %
ERYTHROCYTE [DISTWIDTH] IN BLOOD BY AUTOMATED COUNT: 13 % (ref 11–15)
ERYTHROCYTE [SEDIMENTATION RATE] IN BLOOD BY WESTERGREN METHOD: 25 MM/H
HCT VFR BLD AUTO: 38.5 % (ref 35–45)
HGB BLD-MCNC: 12.9 G/DL (ref 11.7–15.5)
LYMPHOCYTES # BLD AUTO: 2032 CELLS/UL (ref 850–3900)
LYMPHOCYTES NFR BLD AUTO: 23.9 %
MCH RBC QN AUTO: 32.8 PG (ref 27–33)
MCHC RBC AUTO-ENTMCNC: 33.5 G/DL (ref 32–36)
MCV RBC AUTO: 98 FL (ref 80–100)
MONOCYTES # BLD AUTO: 536 CELLS/UL (ref 200–950)
MONOCYTES NFR BLD AUTO: 6.3 %
NEUTROPHILS # BLD AUTO: 5687 CELLS/UL (ref 1500–7800)
NEUTROPHILS NFR BLD AUTO: 66.9 %
PLATELET # BLD AUTO: 313 THOUSAND/UL (ref 140–400)
PMV BLD REES-ECKER: 10.4 FL (ref 7.5–12.5)
RBC # BLD AUTO: 3.93 MILLION/UL (ref 3.8–5.1)
WBC # BLD AUTO: 8.5 THOUSAND/UL (ref 3.8–10.8)

## 2025-08-14 ENCOUNTER — TELEPHONE (OUTPATIENT)
Dept: ORTHOPEDIC SURGERY | Facility: HOSPITAL | Age: 50
End: 2025-08-14
Payer: COMMERCIAL

## 2025-08-19 ENCOUNTER — OFFICE VISIT (OUTPATIENT)
Dept: ORTHOPEDIC SURGERY | Facility: CLINIC | Age: 50
End: 2025-08-19
Payer: COMMERCIAL

## 2025-08-19 DIAGNOSIS — M86.241 SUBACUTE OSTEOMYELITIS OF RIGHT HAND: Primary | ICD-10-CM

## 2025-08-19 DIAGNOSIS — L03.818 CELLULITIS OF OTHER SPECIFIED SITE: ICD-10-CM
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
## 2025-08-19 PROBLEM — L03.90 CELLULITIS: Status: ACTIVE | Noted: 2025-08-19

## 2025-08-19 PROCEDURE — 99213 OFFICE O/P EST LOW 20 MIN: CPT | Performed by: ORTHOPAEDIC SURGERY

## 2025-08-19 ASSESSMENT — PAIN SCALES - GENERAL: PAINLEVEL_OUTOF10: 2

## 2025-08-19 ASSESSMENT — PAIN - FUNCTIONAL ASSESSMENT: PAIN_FUNCTIONAL_ASSESSMENT: 0-10

## 2025-08-20 ENCOUNTER — APPOINTMENT (OUTPATIENT)
Dept: RADIOLOGY | Facility: HOSPITAL | Age: 50
End: 2025-08-20
Payer: COMMERCIAL

## 2025-08-20 ASSESSMENT — ENCOUNTER SYMPTOMS
FEVER: 0
DIAPHORESIS: 0
EYE DISCHARGE: 0
SHORTNESS OF BREATH: 0
COLOR CHANGE: 0
ARTHRALGIAS: 1
TROUBLE SWALLOWING: 0
JOINT SWELLING: 1
WHEEZING: 0
SINUS PRESSURE: 0

## 2025-08-21 ENCOUNTER — APPOINTMENT (OUTPATIENT)
Dept: RADIOLOGY | Facility: HOSPITAL | Age: 50
End: 2025-08-21
Payer: COMMERCIAL

## 2025-08-25 ENCOUNTER — PRE-ADMISSION TESTING (OUTPATIENT)
Dept: PREADMISSION TESTING | Facility: HOSPITAL | Age: 50
End: 2025-08-25
Payer: COMMERCIAL

## 2025-08-25 VITALS
BODY MASS INDEX: 36.8 KG/M2 | DIASTOLIC BLOOD PRESSURE: 86 MMHG | TEMPERATURE: 97 F | OXYGEN SATURATION: 98 % | WEIGHT: 200 LBS | SYSTOLIC BLOOD PRESSURE: 157 MMHG | HEIGHT: 62 IN | HEART RATE: 97 BPM | RESPIRATION RATE: 16 BRPM

## 2025-08-25 DIAGNOSIS — Z01.818 PRE-OP TESTING: Primary | ICD-10-CM

## 2025-08-25 LAB
ANION GAP SERPL CALCULATED.3IONS-SCNC: 10 MMOL/L (ref 10–20)
BUN SERPL-MCNC: 10 MG/DL (ref 6–23)
CALCIUM SERPL-MCNC: 9.4 MG/DL (ref 8.6–10.3)
CHLORIDE SERPL-SCNC: 102 MMOL/L (ref 98–107)
CO2 SERPL-SCNC: 28 MMOL/L (ref 21–32)
CREAT SERPL-MCNC: 0.67 MG/DL (ref 0.5–1.05)
EGFRCR SERPLBLD CKD-EPI 2021: >90 ML/MIN/1.73M*2
GLUCOSE SERPL-MCNC: 84 MG/DL (ref 74–99)
POTASSIUM SERPL-SCNC: 4.2 MMOL/L (ref 3.5–5.3)
SODIUM SERPL-SCNC: 136 MMOL/L (ref 136–145)

## 2025-08-25 PROCEDURE — 80048 BASIC METABOLIC PNL TOTAL CA: CPT

## 2025-08-25 PROCEDURE — 36415 COLL VENOUS BLD VENIPUNCTURE: CPT

## 2025-08-25 RX ORDER — LORATADINE 10 MG/1
10 TABLET ORAL DAILY
Status: ON HOLD | COMMUNITY

## 2025-08-25 RX ORDER — IBUPROFEN 200 MG
400 TABLET ORAL EVERY 6 HOURS PRN
Status: ON HOLD | COMMUNITY

## 2025-08-25 ASSESSMENT — DUKE ACTIVITY SCORE INDEX (DASI)
CAN YOU WALK A BLOCK OR TWO ON LEVEL GROUND: YES
CAN YOU DO HEAVY WORK AROUND THE HOUSE LIKE SCRUBBING FLOORS OR LIFTING AND MOVING HEAVY FURNITURE: YES
CAN YOU WALK INDOORS, SUCH AS AROUND YOUR HOUSE: YES
CAN YOU DO YARD WORK LIKE RAKING LEAVES, WEEDING OR PUSHING A MOWER: YES
DASI METS SCORE: 8
TOTAL_SCORE: 42.7
CAN YOU DO MODERATE WORK AROUND THE HOUSE LIKE VACUUMING, SWEEPING FLOORS OR CARRYING GROCERIES: YES
CAN YOU PARTICIPATE IN STRENOUS SPORTS LIKE SWIMMING, SINGLES TENNIS, FOOTBALL, BASKETBALL, OR SKIING: NO
CAN YOU RUN A SHORT DISTANCE: NO
CAN YOU DO LIGHT WORK AROUND THE HOUSE LIKE DUSTING OR WASHING DISHES: YES
CAN YOU TAKE CARE OF YOURSELF (EAT, DRESS, BATHE, OR USE TOILET): YES
CAN YOU CLIMB A FLIGHT OF STAIRS OR WALK UP A HILL: YES
CAN YOU PARTICIPATE IN MODERATE RECREATIONAL ACTIVITIES LIKE GOLF, BOWLING, DANCING, DOUBLES TENNIS OR THROWING A BASEBALL OR FOOTBALL: YES
CAN YOU HAVE SEXUAL RELATIONS: YES

## 2025-08-25 ASSESSMENT — ENCOUNTER SYMPTOMS
JOINT SWELLING: 1
ARTHRALGIAS: 1

## 2025-08-26 ENCOUNTER — APPOINTMENT (OUTPATIENT)
Dept: ORTHOPEDIC SURGERY | Facility: CLINIC | Age: 50
End: 2025-08-26
Payer: COMMERCIAL

## 2025-08-27 ENCOUNTER — ANESTHESIA EVENT (OUTPATIENT)
Dept: OPERATING ROOM | Facility: HOSPITAL | Age: 50
End: 2025-08-27
Payer: COMMERCIAL

## 2025-08-27 ENCOUNTER — HOSPITAL ENCOUNTER (INPATIENT)
Facility: HOSPITAL | Age: 50
Discharge: HOME HEALTH | DRG: 513 | End: 2025-08-27
Attending: ORTHOPAEDIC SURGERY | Admitting: HOSPITALIST
Payer: COMMERCIAL

## 2025-08-27 ENCOUNTER — ANESTHESIA (OUTPATIENT)
Dept: OPERATING ROOM | Facility: HOSPITAL | Age: 50
End: 2025-08-27
Payer: COMMERCIAL

## 2025-08-27 PROBLEM — E66.9 OBESITY: Status: ACTIVE | Noted: 2025-08-27

## 2025-08-27 PROBLEM — M86.141: Status: ACTIVE | Noted: 2025-08-27

## 2025-08-27 PROCEDURE — A26145 PR RAD EXCIS JT LINING,FLEXOR,EACH: Performed by: NURSE ANESTHETIST, CERTIFIED REGISTERED

## 2025-08-27 PROCEDURE — 2500000004 HC RX 250 GENERAL PHARMACY W/ HCPCS (ALT 636 FOR OP/ED)

## 2025-08-27 PROCEDURE — A26145 PR RAD EXCIS JT LINING,FLEXOR,EACH: Performed by: STUDENT IN AN ORGANIZED HEALTH CARE EDUCATION/TRAINING PROGRAM

## 2025-08-27 PROCEDURE — 2500000004 HC RX 250 GENERAL PHARMACY W/ HCPCS (ALT 636 FOR OP/ED): Performed by: NURSE ANESTHETIST, CERTIFIED REGISTERED

## 2025-08-27 RX ORDER — SODIUM CHLORIDE, SODIUM LACTATE, POTASSIUM CHLORIDE, CALCIUM CHLORIDE 600; 310; 30; 20 MG/100ML; MG/100ML; MG/100ML; MG/100ML
INJECTION, SOLUTION INTRAVENOUS CONTINUOUS PRN
Status: DISCONTINUED | OUTPATIENT
Start: 2025-08-27 | End: 2025-08-27

## 2025-08-27 RX ORDER — LIDOCAINE HYDROCHLORIDE 10 MG/ML
INJECTION, SOLUTION INFILTRATION; PERINEURAL AS NEEDED
Status: DISCONTINUED | OUTPATIENT
Start: 2025-08-27 | End: 2025-08-27

## 2025-08-27 RX ORDER — MIDAZOLAM HYDROCHLORIDE 1 MG/ML
INJECTION, SOLUTION INTRAMUSCULAR; INTRAVENOUS AS NEEDED
Status: DISCONTINUED | OUTPATIENT
Start: 2025-08-27 | End: 2025-08-27

## 2025-08-27 RX ORDER — CEFAZOLIN 1 G/1
INJECTION, POWDER, FOR SOLUTION INTRAVENOUS AS NEEDED
Status: DISCONTINUED | OUTPATIENT
Start: 2025-08-27 | End: 2025-08-27

## 2025-08-27 RX ORDER — ONDANSETRON HYDROCHLORIDE 2 MG/ML
INJECTION, SOLUTION INTRAVENOUS AS NEEDED
Status: DISCONTINUED | OUTPATIENT
Start: 2025-08-27 | End: 2025-08-27

## 2025-08-27 RX ORDER — PROPOFOL 10 MG/ML
INJECTION, EMULSION INTRAVENOUS AS NEEDED
Status: DISCONTINUED | OUTPATIENT
Start: 2025-08-27 | End: 2025-08-27

## 2025-08-27 RX ORDER — FENTANYL CITRATE 50 UG/ML
INJECTION, SOLUTION INTRAMUSCULAR; INTRAVENOUS AS NEEDED
Status: DISCONTINUED | OUTPATIENT
Start: 2025-08-27 | End: 2025-08-27

## 2025-08-27 RX ADMIN — FENTANYL CITRATE 50 MCG: 50 INJECTION, SOLUTION INTRAMUSCULAR; INTRAVENOUS at 14:23

## 2025-08-27 RX ADMIN — MIDAZOLAM 2 MG: 1 INJECTION INTRAMUSCULAR; INTRAVENOUS at 14:19

## 2025-08-27 RX ADMIN — SODIUM CHLORIDE, POTASSIUM CHLORIDE, SODIUM LACTATE AND CALCIUM CHLORIDE: 600; 310; 30; 20 INJECTION, SOLUTION INTRAVENOUS at 14:19

## 2025-08-27 RX ADMIN — PROPOFOL 200 MG: 10 INJECTION, EMULSION INTRAVENOUS at 14:23

## 2025-08-27 RX ADMIN — DEXAMETHASONE SODIUM PHOSPHATE 8 MG: 4 INJECTION, SOLUTION INTRAMUSCULAR; INTRAVENOUS at 14:23

## 2025-08-27 RX ADMIN — FENTANYL CITRATE 50 MCG: 50 INJECTION, SOLUTION INTRAMUSCULAR; INTRAVENOUS at 14:35

## 2025-08-27 RX ADMIN — LIDOCAINE HYDROCHLORIDE 50 MG: 10 INJECTION, SOLUTION INFILTRATION; PERINEURAL at 14:23

## 2025-08-27 RX ADMIN — CEFAZOLIN 2 G: 1 INJECTION, POWDER, FOR SOLUTION INTRAMUSCULAR; INTRAVENOUS at 15:01

## 2025-08-27 RX ADMIN — ONDANSETRON 4 MG: 2 INJECTION, SOLUTION INTRAMUSCULAR; INTRAVENOUS at 14:23

## 2025-08-27 SDOH — HEALTH STABILITY: MENTAL HEALTH: HOW OFTEN DO YOU HAVE A DRINK CONTAINING ALCOHOL?: NEVER

## 2025-08-27 SDOH — SOCIAL STABILITY: SOCIAL INSECURITY: ARE THERE ANY APPARENT SIGNS OF INJURIES/BEHAVIORS THAT COULD BE RELATED TO ABUSE/NEGLECT?: NO

## 2025-08-27 SDOH — SOCIAL STABILITY: SOCIAL INSECURITY: WITHIN THE LAST YEAR, HAVE YOU BEEN HUMILIATED OR EMOTIONALLY ABUSED IN OTHER WAYS BY YOUR PARTNER OR EX-PARTNER?: NO

## 2025-08-27 SDOH — HEALTH STABILITY: MENTAL HEALTH: HOW OFTEN DO YOU HAVE SIX OR MORE DRINKS ON ONE OCCASION?: NEVER

## 2025-08-27 SDOH — SOCIAL STABILITY: SOCIAL INSECURITY
WITHIN THE LAST YEAR, HAVE YOU BEEN KICKED, HIT, SLAPPED, OR OTHERWISE PHYSICALLY HURT BY YOUR PARTNER OR EX-PARTNER?: NO

## 2025-08-27 SDOH — SOCIAL STABILITY: SOCIAL NETWORK: HOW OFTEN DO YOU GET TOGETHER WITH FRIENDS OR RELATIVES?: NEVER

## 2025-08-27 SDOH — SOCIAL STABILITY: SOCIAL INSECURITY: WITHIN THE LAST YEAR, HAVE YOU BEEN AFRAID OF YOUR PARTNER OR EX-PARTNER?: NO

## 2025-08-27 SDOH — SOCIAL STABILITY: SOCIAL INSECURITY
WITHIN THE LAST YEAR, HAVE YOU BEEN RAPED OR FORCED TO HAVE ANY KIND OF SEXUAL ACTIVITY BY YOUR PARTNER OR EX-PARTNER?: NO

## 2025-08-27 SDOH — HEALTH STABILITY: PHYSICAL HEALTH: ON AVERAGE, HOW MANY MINUTES DO YOU ENGAGE IN EXERCISE AT THIS LEVEL?: 40 MIN

## 2025-08-27 SDOH — SOCIAL STABILITY: SOCIAL NETWORK: IN A TYPICAL WEEK, HOW MANY TIMES DO YOU TALK ON THE PHONE WITH FAMILY, FRIENDS, OR NEIGHBORS?: NEVER

## 2025-08-27 SDOH — SOCIAL STABILITY: SOCIAL INSECURITY: WERE YOU ABLE TO COMPLETE ALL THE BEHAVIORAL HEALTH SCREENINGS?: YES

## 2025-08-27 SDOH — HEALTH STABILITY: MENTAL HEALTH: HOW MANY DRINKS CONTAINING ALCOHOL DO YOU HAVE ON A TYPICAL DAY WHEN YOU ARE DRINKING?: 1 OR 2

## 2025-08-27 SDOH — HEALTH STABILITY: MENTAL HEALTH: CURRENT SMOKER: 0

## 2025-08-27 SDOH — ECONOMIC STABILITY: FOOD INSECURITY: WITHIN THE PAST 12 MONTHS, YOU WORRIED THAT YOUR FOOD WOULD RUN OUT BEFORE YOU GOT THE MONEY TO BUY MORE.: NEVER TRUE

## 2025-08-27 SDOH — ECONOMIC STABILITY: INCOME INSECURITY: IN THE PAST 12 MONTHS HAS THE ELECTRIC, GAS, OIL, OR WATER COMPANY THREATENED TO SHUT OFF SERVICES IN YOUR HOME?: NO

## 2025-08-27 SDOH — SOCIAL STABILITY: SOCIAL INSECURITY: ARE YOU MARRIED, WIDOWED, DIVORCED, SEPARATED, NEVER MARRIED, OR LIVING WITH A PARTNER?: LIVING WITH PARTNER

## 2025-08-27 SDOH — ECONOMIC STABILITY: FOOD INSECURITY: WITHIN THE PAST 12 MONTHS, THE FOOD YOU BOUGHT JUST DIDN'T LAST AND YOU DIDN'T HAVE MONEY TO GET MORE.: NEVER TRUE

## 2025-08-27 SDOH — HEALTH STABILITY: MENTAL HEALTH
DO YOU FEEL STRESS - TENSE, RESTLESS, NERVOUS, OR ANXIOUS, OR UNABLE TO SLEEP AT NIGHT BECAUSE YOUR MIND IS TROUBLED ALL THE TIME - THESE DAYS?: NOT AT ALL

## 2025-08-27 SDOH — HEALTH STABILITY: PHYSICAL HEALTH
HOW OFTEN DO YOU NEED TO HAVE SOMEONE HELP YOU WHEN YOU READ INSTRUCTIONS, PAMPHLETS, OR OTHER WRITTEN MATERIAL FROM YOUR DOCTOR OR PHARMACY?: NEVER

## 2025-08-27 SDOH — ECONOMIC STABILITY: HOUSING INSECURITY: IN THE LAST 12 MONTHS, WAS THERE A TIME WHEN YOU WERE NOT ABLE TO PAY THE MORTGAGE OR RENT ON TIME?: NO

## 2025-08-27 SDOH — ECONOMIC STABILITY: TRANSPORTATION INSECURITY: IN THE PAST 12 MONTHS, HAS LACK OF TRANSPORTATION KEPT YOU FROM MEDICAL APPOINTMENTS OR FROM GETTING MEDICATIONS?: NO

## 2025-08-27 SDOH — SOCIAL STABILITY: SOCIAL INSECURITY: HAVE YOU HAD THOUGHTS OF HARMING ANYONE ELSE?: NO

## 2025-08-27 SDOH — SOCIAL STABILITY: SOCIAL INSECURITY: HAVE YOU HAD ANY THOUGHTS OF HARMING ANYONE ELSE?: NO

## 2025-08-27 SDOH — SOCIAL STABILITY: SOCIAL INSECURITY: ABUSE: ADULT

## 2025-08-27 SDOH — ECONOMIC STABILITY: HOUSING INSECURITY: AT ANY TIME IN THE PAST 12 MONTHS, WERE YOU HOMELESS OR LIVING IN A SHELTER (INCLUDING NOW)?: NO

## 2025-08-27 SDOH — SOCIAL STABILITY: SOCIAL INSECURITY: DOES ANYONE TRY TO KEEP YOU FROM HAVING/CONTACTING OTHER FRIENDS OR DOING THINGS OUTSIDE YOUR HOME?: NO

## 2025-08-27 SDOH — SOCIAL STABILITY: SOCIAL NETWORK: HOW OFTEN DO YOU ATTEND CHURCH OR RELIGIOUS SERVICES?: NEVER

## 2025-08-27 SDOH — SOCIAL STABILITY: SOCIAL NETWORK: HOW OFTEN DO YOU ATTEND MEETINGS OF THE CLUBS OR ORGANIZATIONS YOU BELONG TO?: NEVER

## 2025-08-27 SDOH — ECONOMIC STABILITY: HOUSING INSECURITY: IN THE PAST 12 MONTHS, HOW MANY TIMES HAVE YOU MOVED WHERE YOU WERE LIVING?: 0

## 2025-08-27 SDOH — ECONOMIC STABILITY: FOOD INSECURITY: HOW HARD IS IT FOR YOU TO PAY FOR THE VERY BASICS LIKE FOOD, HOUSING, MEDICAL CARE, AND HEATING?: NOT VERY HARD

## 2025-08-27 SDOH — HEALTH STABILITY: PHYSICAL HEALTH: ON AVERAGE, HOW MANY DAYS PER WEEK DO YOU ENGAGE IN MODERATE TO STRENUOUS EXERCISE (LIKE A BRISK WALK)?: 3 DAYS

## 2025-08-27 SDOH — SOCIAL STABILITY: SOCIAL INSECURITY: ARE YOU OR HAVE YOU BEEN THREATENED OR ABUSED PHYSICALLY, EMOTIONALLY, OR SEXUALLY BY ANYONE?: NO

## 2025-08-27 SDOH — ECONOMIC STABILITY: HOUSING INSECURITY: DO YOU FEEL UNSAFE GOING BACK TO THE PLACE WHERE YOU LIVE?: NO

## 2025-08-27 SDOH — SOCIAL STABILITY: SOCIAL INSECURITY: DO YOU FEEL ANYONE HAS EXPLOITED OR TAKEN ADVANTAGE OF YOU FINANCIALLY OR OF YOUR PERSONAL PROPERTY?: NO

## 2025-08-27 SDOH — SOCIAL STABILITY: SOCIAL NETWORK
DO YOU BELONG TO ANY CLUBS OR ORGANIZATIONS SUCH AS CHURCH GROUPS, UNIONS, FRATERNAL OR ATHLETIC GROUPS, OR SCHOOL GROUPS?: NO

## 2025-08-27 SDOH — SOCIAL STABILITY: SOCIAL INSECURITY: HAS ANYONE EVER THREATENED TO HURT YOUR FAMILY OR YOUR PETS?: NO

## 2025-08-27 SDOH — SOCIAL STABILITY: SOCIAL INSECURITY: DO YOU FEEL UNSAFE GOING BACK TO THE PLACE WHERE YOU ARE LIVING?: NO

## 2025-08-27 SDOH — SOCIAL STABILITY: SOCIAL INSECURITY
ASK PARENT OR GUARDIAN: ARE THERE TIMES WHEN YOU, YOUR CHILD(REN), OR ANY MEMBER OF YOUR HOUSEHOLD FEEL UNSAFE, HARMED, OR THREATENED AROUND PERSONS WITH WHOM YOU KNOW OR LIVE?: NO

## 2025-08-27 ASSESSMENT — COGNITIVE AND FUNCTIONAL STATUS - GENERAL
DRESSING REGULAR LOWER BODY CLOTHING: A LITTLE
DRESSING REGULAR UPPER BODY CLOTHING: A LITTLE
MOBILITY SCORE: 22
HELP NEEDED FOR BATHING: A LITTLE
DAILY ACTIVITIY SCORE: 21
WALKING IN HOSPITAL ROOM: A LITTLE
STANDING UP FROM CHAIR USING ARMS: A LITTLE
CLIMB 3 TO 5 STEPS WITH RAILING: A LITTLE
WALKING IN HOSPITAL ROOM: A LITTLE
DRESSING REGULAR LOWER BODY CLOTHING: A LITTLE
DRESSING REGULAR UPPER BODY CLOTHING: A LITTLE
HELP NEEDED FOR BATHING: A LITTLE
PATIENT BASELINE BEDBOUND: NO
DAILY ACTIVITIY SCORE: 21
CLIMB 3 TO 5 STEPS WITH RAILING: A LITTLE
MOBILITY SCORE: 21

## 2025-08-27 ASSESSMENT — PAIN DESCRIPTION - DESCRIPTORS
DESCRIPTORS: ACHING;BURNING
DESCRIPTORS: ACHING;BURNING
DESCRIPTORS: ACHING
DESCRIPTORS: ACHING;BURNING;DISCOMFORT
DESCRIPTORS: ACHING;BURNING
DESCRIPTORS: ACHING
DESCRIPTORS: ACHING;BURNING

## 2025-08-27 ASSESSMENT — PAIN SCALES - GENERAL
PAINLEVEL_OUTOF10: 7
PAINLEVEL_OUTOF10: 9
PAINLEVEL_OUTOF10: 1
PAINLEVEL_OUTOF10: 3
PAINLEVEL_OUTOF10: 0 - NO PAIN
PAINLEVEL_OUTOF10: 5 - MODERATE PAIN
PAINLEVEL_OUTOF10: 0 - NO PAIN
PAINLEVEL_OUTOF10: 8
PAINLEVEL_OUTOF10: 3
PAINLEVEL_OUTOF10: 9
PAINLEVEL_OUTOF10: 7

## 2025-08-27 ASSESSMENT — PAIN - FUNCTIONAL ASSESSMENT
PAIN_FUNCTIONAL_ASSESSMENT: 0-10

## 2025-08-27 ASSESSMENT — ACTIVITIES OF DAILY LIVING (ADL)
PATIENT'S MEMORY ADEQUATE TO SAFELY COMPLETE DAILY ACTIVITIES?: YES
FEEDING YOURSELF: INDEPENDENT
BATHING: INDEPENDENT
HEARING - RIGHT EAR: FUNCTIONAL
WALKS IN HOME: INDEPENDENT
DRESSING YOURSELF: INDEPENDENT
LACK_OF_TRANSPORTATION: NO
DRESSING YOURSELF: INDEPENDENT
WALKS IN HOME: INDEPENDENT
TOILETING: INDEPENDENT
JUDGMENT_ADEQUATE_SAFELY_COMPLETE_DAILY_ACTIVITIES: YES
LACK_OF_TRANSPORTATION: NO
HEARING - LEFT EAR: FUNCTIONAL
HEARING - LEFT EAR: FUNCTIONAL
ADEQUATE_TO_COMPLETE_ADL: YES
GROOMING: INDEPENDENT
FEEDING YOURSELF: INDEPENDENT
TOILETING: INDEPENDENT
JUDGMENT_ADEQUATE_SAFELY_COMPLETE_DAILY_ACTIVITIES: YES
BATHING: INDEPENDENT
HEARING - RIGHT EAR: FUNCTIONAL
GROOMING: INDEPENDENT
PATIENT'S MEMORY ADEQUATE TO SAFELY COMPLETE DAILY ACTIVITIES?: YES

## 2025-08-27 ASSESSMENT — LIFESTYLE VARIABLES
SKIP TO QUESTIONS 9-10: 1
HOW OFTEN DO YOU HAVE A DRINK CONTAINING ALCOHOL: MONTHLY OR LESS
HOW OFTEN DO YOU HAVE 6 OR MORE DRINKS ON ONE OCCASION: NEVER
SKIP TO QUESTIONS 9-10: 1
AUDIT-C TOTAL SCORE: 1
HOW MANY STANDARD DRINKS CONTAINING ALCOHOL DO YOU HAVE ON A TYPICAL DAY: 1 OR 2
AUDIT-C TOTAL SCORE: 1
SUBSTANCE_ABUSE_PAST_12_MONTHS: NO
PRESCIPTION_ABUSE_PAST_12_MONTHS: NO
AUDIT-C TOTAL SCORE: 0

## 2025-08-27 ASSESSMENT — PATIENT HEALTH QUESTIONNAIRE - PHQ9
SUM OF ALL RESPONSES TO PHQ9 QUESTIONS 1 & 2: 0
1. LITTLE INTEREST OR PLEASURE IN DOING THINGS: NOT AT ALL
2. FEELING DOWN, DEPRESSED OR HOPELESS: NOT AT ALL

## 2025-08-28 ASSESSMENT — PAIN SCALES - GENERAL
PAINLEVEL_OUTOF10: 7
PAINLEVEL_OUTOF10: 0 - NO PAIN
PAINLEVEL_OUTOF10: 2
PAINLEVEL_OUTOF10: 2
PAINLEVEL_OUTOF10: 7
PAINLEVEL_OUTOF10: 2
PAINLEVEL_OUTOF10: 2
PAINLEVEL_OUTOF10: 7
PAINLEVEL_OUTOF10: 7
PAINLEVEL_OUTOF10: 2

## 2025-08-28 ASSESSMENT — PAIN - FUNCTIONAL ASSESSMENT
PAIN_FUNCTIONAL_ASSESSMENT: 0-10

## 2025-08-28 ASSESSMENT — PAIN DESCRIPTION - DESCRIPTORS
DESCRIPTORS: ACHING;DISCOMFORT
DESCRIPTORS: STABBING;THROBBING
DESCRIPTORS: ACHING;DISCOMFORT
DESCRIPTORS: ACHING;DISCOMFORT
DESCRIPTORS: STABBING;THROBBING

## 2025-08-28 ASSESSMENT — PAIN DESCRIPTION - LOCATION
LOCATION: HAND
LOCATION: HAND

## 2025-08-28 ASSESSMENT — PAIN DESCRIPTION - ORIENTATION
ORIENTATION: RIGHT
ORIENTATION: RIGHT

## 2025-08-29 ASSESSMENT — COGNITIVE AND FUNCTIONAL STATUS - GENERAL
MOBILITY SCORE: 24
DAILY ACTIVITIY SCORE: 24

## 2025-08-29 ASSESSMENT — PAIN SCALES - GENERAL
PAINLEVEL_OUTOF10: 4
PAINLEVEL_OUTOF10: 5 - MODERATE PAIN
PAINLEVEL_OUTOF10: 6

## 2025-08-29 ASSESSMENT — PAIN - FUNCTIONAL ASSESSMENT: PAIN_FUNCTIONAL_ASSESSMENT: 0-10

## 2025-08-30 DIAGNOSIS — M86.141: ICD-10-CM

## 2025-08-30 ASSESSMENT — COGNITIVE AND FUNCTIONAL STATUS - GENERAL
DAILY ACTIVITIY SCORE: 24
MOBILITY SCORE: 24

## 2025-08-30 ASSESSMENT — PAIN DESCRIPTION - DESCRIPTORS
DESCRIPTORS: ACHING;THROBBING
DESCRIPTORS: ACHING
DESCRIPTORS: ACHING;THROBBING
DESCRIPTORS: ACHING

## 2025-08-30 ASSESSMENT — PAIN DESCRIPTION - ORIENTATION: ORIENTATION: RIGHT

## 2025-08-30 ASSESSMENT — PAIN - FUNCTIONAL ASSESSMENT
PAIN_FUNCTIONAL_ASSESSMENT: 0-10

## 2025-08-30 ASSESSMENT — PAIN SCALES - GENERAL
PAINLEVEL_OUTOF10: 7
PAINLEVEL_OUTOF10: 2
PAINLEVEL_OUTOF10: 2
PAINLEVEL_OUTOF10: 7

## 2025-08-30 ASSESSMENT — PAIN DESCRIPTION - LOCATION: LOCATION: FINGER (COMMENT WHICH ONE)

## 2025-08-31 ASSESSMENT — PAIN SCALES - GENERAL
PAINLEVEL_OUTOF10: 1
PAINLEVEL_OUTOF10: 3
PAINLEVEL_OUTOF10: 7
PAINLEVEL_OUTOF10: 3
PAINLEVEL_OUTOF10: 0 - NO PAIN
PAINLEVEL_OUTOF10: 0 - NO PAIN
PAINLEVEL_OUTOF10: 7
PAINLEVEL_OUTOF10: 7

## 2025-08-31 ASSESSMENT — PAIN - FUNCTIONAL ASSESSMENT
PAIN_FUNCTIONAL_ASSESSMENT: 0-10
PAIN_FUNCTIONAL_ASSESSMENT: UNABLE TO SELF-REPORT
PAIN_FUNCTIONAL_ASSESSMENT: 0-10
PAIN_FUNCTIONAL_ASSESSMENT: 0-10

## 2025-08-31 ASSESSMENT — PAIN DESCRIPTION - ORIENTATION
ORIENTATION: RIGHT

## 2025-08-31 ASSESSMENT — COGNITIVE AND FUNCTIONAL STATUS - GENERAL
DAILY ACTIVITIY SCORE: 24
MOBILITY SCORE: 24

## 2025-08-31 ASSESSMENT — PAIN DESCRIPTION - DESCRIPTORS
DESCRIPTORS: ACHING
DESCRIPTORS: SORE
DESCRIPTORS: DISCOMFORT
DESCRIPTORS: ACHING
DESCRIPTORS: SORE
DESCRIPTORS: ACHING
DESCRIPTORS: ACHING

## 2025-08-31 ASSESSMENT — PAIN DESCRIPTION - LOCATION
LOCATION: FINGER (COMMENT WHICH ONE)
LOCATION: FINGER (COMMENT WHICH ONE)

## 2025-09-01 ASSESSMENT — COGNITIVE AND FUNCTIONAL STATUS - GENERAL
MOBILITY SCORE: 24
DAILY ACTIVITIY SCORE: 24
MOBILITY SCORE: 24
DAILY ACTIVITIY SCORE: 24

## 2025-09-01 ASSESSMENT — PAIN DESCRIPTION - LOCATION: LOCATION: FINGER (COMMENT WHICH ONE)

## 2025-09-01 ASSESSMENT — PAIN - FUNCTIONAL ASSESSMENT
PAIN_FUNCTIONAL_ASSESSMENT: 0-10

## 2025-09-01 ASSESSMENT — PAIN SCALES - GENERAL
PAINLEVEL_OUTOF10: 7
PAINLEVEL_OUTOF10: 2
PAINLEVEL_OUTOF10: 3
PAINLEVEL_OUTOF10: 0 - NO PAIN
PAINLEVEL_OUTOF10: 2
PAINLEVEL_OUTOF10: 2

## 2025-09-01 ASSESSMENT — PAIN DESCRIPTION - DESCRIPTORS
DESCRIPTORS: ACHING
DESCRIPTORS: DULL
DESCRIPTORS: ACHING
DESCRIPTORS: ACHING;SHARP
DESCRIPTORS: ACHING

## 2025-09-01 ASSESSMENT — PAIN DESCRIPTION - ORIENTATION: ORIENTATION: RIGHT

## 2025-09-02 ENCOUNTER — DOCUMENTATION (OUTPATIENT)
Dept: HOME HEALTH SERVICES | Facility: HOME HEALTH | Age: 50
End: 2025-09-02
Payer: COMMERCIAL

## 2025-09-02 ENCOUNTER — HOME INFUSION (OUTPATIENT)
Dept: INFUSION THERAPY | Age: 50
End: 2025-09-02
Payer: COMMERCIAL

## 2025-09-02 ASSESSMENT — COGNITIVE AND FUNCTIONAL STATUS - GENERAL
DAILY ACTIVITIY SCORE: 24
MOBILITY SCORE: 24

## 2025-09-02 ASSESSMENT — PAIN - FUNCTIONAL ASSESSMENT
PAIN_FUNCTIONAL_ASSESSMENT: UNABLE TO SELF-REPORT
PAIN_FUNCTIONAL_ASSESSMENT: 0-10
PAIN_FUNCTIONAL_ASSESSMENT: 0-10
PAIN_FUNCTIONAL_ASSESSMENT: UNABLE TO SELF-REPORT
PAIN_FUNCTIONAL_ASSESSMENT: 0-10

## 2025-09-02 ASSESSMENT — PAIN DESCRIPTION - ORIENTATION: ORIENTATION: RIGHT

## 2025-09-02 ASSESSMENT — PAIN DESCRIPTION - LOCATION: LOCATION: FINGER (COMMENT WHICH ONE)

## 2025-09-02 ASSESSMENT — PAIN DESCRIPTION - DESCRIPTORS
DESCRIPTORS: SHARP
DESCRIPTORS: SHARP

## 2025-09-02 ASSESSMENT — PAIN SCALES - GENERAL
PAINLEVEL_OUTOF10: 0 - NO PAIN
PAINLEVEL_OUTOF10: 7
PAINLEVEL_OUTOF10: 3

## 2025-09-04 ASSESSMENT — PAIN SCALES - GENERAL: PAINLEVEL_OUTOF10: 3

## 2025-09-19 ENCOUNTER — APPOINTMENT (OUTPATIENT)
Dept: RADIOLOGY | Facility: HOSPITAL | Age: 50
End: 2025-09-19
Payer: COMMERCIAL